# Patient Record
Sex: MALE | Race: WHITE | Employment: FULL TIME | URBAN - METROPOLITAN AREA
[De-identification: names, ages, dates, MRNs, and addresses within clinical notes are randomized per-mention and may not be internally consistent; named-entity substitution may affect disease eponyms.]

---

## 2017-01-05 ENCOUNTER — OFFICE VISIT (OUTPATIENT)
Dept: UROLOGY | Facility: CLINIC | Age: 52
End: 2017-01-05
Payer: COMMERCIAL

## 2017-01-05 VITALS
DIASTOLIC BLOOD PRESSURE: 94 MMHG | SYSTOLIC BLOOD PRESSURE: 126 MMHG | HEIGHT: 71 IN | WEIGHT: 185 LBS | BODY MASS INDEX: 25.9 KG/M2

## 2017-01-05 DIAGNOSIS — N35.919 URETHRAL STRICTURE: Primary | ICD-10-CM

## 2017-01-05 DIAGNOSIS — N35.011 POST-TRAUMATIC BULBOUS URETHRAL STRICTURE: Primary | ICD-10-CM

## 2017-01-05 PROCEDURE — 99213 OFFICE O/P EST LOW 20 MIN: CPT | Mod: 25 | Performed by: UROLOGY

## 2017-01-05 PROCEDURE — 52281 CYSTOSCOPY AND TREATMENT: CPT | Performed by: UROLOGY

## 2017-01-05 RX ORDER — SIMVASTATIN 10 MG
TABLET ORAL
Refills: 1 | COMMUNITY
Start: 2016-11-18 | End: 2017-08-07

## 2017-01-05 RX ORDER — CIPROFLOXACIN 500 MG/1
500 TABLET, FILM COATED ORAL 2 TIMES DAILY
Qty: 1 TABLET | Refills: 0 | Status: SHIPPED | OUTPATIENT
Start: 2017-01-05 | End: 2017-02-06

## 2017-01-05 ASSESSMENT — PAIN SCALES - GENERAL: PAINLEVEL: NO PAIN (0)

## 2017-01-05 NOTE — Clinical Note
"1/5/2017       RE: Montana Jo  [address] [address]     Dear Colleague,    Thank you for referring your patient, Montana Jo, to the [facility] [place] at [facility]. Please see a copy of my visit note below.    History: it is a great pleasure to see this very pleasant 51-year-old gentleman for the first time today.  He rosemary been seen by my partners in the past who have now retired.  He has a long history over 30 years of a stricture in the urethra which is being dilated every 2 years.  The symptoms are returning, and he is ready for another dilatation.  He does also have a strong family history of prostate cancer.      Past Medical History   Diagnosis Date     High cholesterol        Social History     Social History     Marital Status:      Spouse Name: N/A     Number of Children: N/A     Years of Education: N/A     Social History Main Topics     Smoking status: Never Smoker      Smokeless tobacco: Never Used     Alcohol Use: None     Drug Use: None     Sexual Activity: Not Asked     Other Topics Concern     None     Social History Narrative     None       No past surgical history on file.    No family history on file.      Current outpatient prescriptions:      ciprofloxacin (CIPRO) 500 MG tablet, Take 1 tablet (500 mg) by mouth 2 times daily, Disp: 1 tablet, Rfl: 0     simvastatin (ZOCOR) 10 MG tablet, TK 1 T PO HS, Disp: , Rfl: 1    10 point ROS of systems including Constitutional, Eyes, Respiratory, Cardiovascular, Gastroenterology, Genitourinary, Integumentary, Muscularskeletal, Psychiatric were all negative except for pertinent positives noted in my HPI.    Examination:   /94 mmHg  Ht 1.803 m (5' 11\")  Wt 83.915 kg (185 lb)  BMI 25.81 kg/m2  General Impression: very pleasant gentleman in no acute distress, well-oriented in time place and person  Mental Status: normal    Procedure.  Cystoscopy with " urethral dilatation of stricture  Surgeon.   Nasim.  Anesthesia.  Local anesthesia.  Description.  With the patient in supine position and the genital area prepped and draped in the customary fashion, the flexible cystoscope was carefully passed into the penile urethra.  The meatus was normal.  There was a narrow stricture in the mid urethra which was discrete and was a relatively short stenotic segment.  I wasable to pass the instrument through this very carefully and observe that the rest of the proximal urethra was not affected by stricture.  That the external sphincter was intact, the prostatic lobes were not significantly hypoplastic, and the the interior the bladder showed no evidence of neoplasm or stone.  I passed an 035 stiff wirethrough the cystoscope into the bladder and withdrew the cystoscope.  i then dilated the stricture over the guidewire with Ordoñez sounds from 16 up to 24 Liberian.  At the completion of the procedure I withdrew the guidewire.    Impression.  He would likely need another dilatation in 2 years, but I have discussed with him the possibility of urethroplasty being considered to recommend him meet with Dr. Deni Foster for discussion about this option.  I did discuss the entire situation with the patient in detail today.  I answered all his questions        Plan: we will plan repeat dilatation in 2 years but I would like him to see Dr. Foster for his opinion with regarding urethroplasty    Time: 15 minutes was spent in addition to the procedure as this was our first meeting requiring careful review of the records and discussion ultimately about the possibility of urethroplasty          Again, thank you for allowing me to participate in the care of your patient.      Sincerely,    Chintan Rouse MD

## 2017-01-05 NOTE — PROGRESS NOTES
"History: it is a great pleasure to see this very pleasant 51-year-old gentleman for the first time today.  He rosemary been seen by my partners in the past who have now retired.  He has a long history over 30 years of a stricture in the urethra which is being dilated every 2 years.  The symptoms are returning, and he is ready for another dilatation.  He does also have a strong family history of prostate cancer.      Past Medical History   Diagnosis Date     High cholesterol        Social History     Social History     Marital Status:      Spouse Name: N/A     Number of Children: N/A     Years of Education: N/A     Social History Main Topics     Smoking status: Never Smoker      Smokeless tobacco: Never Used     Alcohol Use: None     Drug Use: None     Sexual Activity: Not Asked     Other Topics Concern     None     Social History Narrative     None       No past surgical history on file.    No family history on file.      Current outpatient prescriptions:      ciprofloxacin (CIPRO) 500 MG tablet, Take 1 tablet (500 mg) by mouth 2 times daily, Disp: 1 tablet, Rfl: 0     simvastatin (ZOCOR) 10 MG tablet, TK 1 T PO HS, Disp: , Rfl: 1    10 point ROS of systems including Constitutional, Eyes, Respiratory, Cardiovascular, Gastroenterology, Genitourinary, Integumentary, Muscularskeletal, Psychiatric were all negative except for pertinent positives noted in my HPI.    Examination:   /94 mmHg  Ht 1.803 m (5' 11\")  Wt 83.915 kg (185 lb)  BMI 25.81 kg/m2  General Impression: very pleasant gentleman in no acute distress, well-oriented in time place and person  Mental Status: normal    Procedure.  Cystoscopy with urethral dilatation of stricture  Surgeon.   Nasim.  Anesthesia.  Local anesthesia.  Description.  With the patient in supine position and the genital area prepped and draped in the customary fashion, the flexible cystoscope was carefully passed into the penile urethra.  The meatus was normal.  There was " a narrow stricture in the mid urethra which was discrete and was a relatively short stenotic segment.  I wasable to pass the instrument through this very carefully and observe that the rest of the proximal urethra was not affected by stricture.  That the external sphincter was intact, the prostatic lobes were not significantly hypoplastic, and the the interior the bladder showed no evidence of neoplasm or stone.  I passed an 035 stiff wirethrough the cystoscope into the bladder and withdrew the cystoscope.  i then dilated the stricture over the guidewire with Ordoñez sounds from 16 up to 24 Swiss.  At the completion of the procedure I withdrew the guidewire.    Impression.  He would likely need another dilatation in 2 years, but I have discussed with him the possibility of urethroplasty being considered to recommend him meet with Dr. Deni Foster for discussion about this option.  I did discuss the entire situation with the patient in detail today.  I answered all his questions        Plan: we will plan repeat dilatation in 2 years but I would like him to see Dr. Foster for his opinion with regarding urethroplasty    Time: 15 minutes was spent in addition to the procedure as this was our first meeting requiring careful review of the records and discussion ultimately about the possibility of urethroplasty

## 2017-01-05 NOTE — Clinical Note
Date:January 6, 2017      Patient was self referred, no letter generated. Do not send.        [facility] Health Information

## 2017-01-05 NOTE — NURSING NOTE
Chief Complaint   Patient presents with     Other     Patient is here for dilation      Valdez Irving LPN 3:44 PM January 5, 2017

## 2017-01-09 ENCOUNTER — PRE VISIT (OUTPATIENT)
Dept: UROLOGY | Facility: CLINIC | Age: 52
End: 2017-01-09

## 2017-01-09 DIAGNOSIS — N35.919 URETHRAL STRICTURE: Primary | ICD-10-CM

## 2017-01-09 NOTE — TELEPHONE ENCOUNTER
Records are available in [facility]. Message sent to [facility] regarding [facility]UG orders. nu

## 2017-01-31 ENCOUNTER — PRE VISIT (OUTPATIENT)
Dept: UROLOGY | Facility: CLINIC | Age: 52
End: 2017-01-31

## 2017-01-31 NOTE — TELEPHONE ENCOUNTER
Patient with a urethral stricture coming in for a consult. Chart reviewed and records are available, and imaging is scheduled. Pt had a recent dilation. Message left asking patient to come with a full bladder for a flow/pvr.

## 2017-02-06 ENCOUNTER — OFFICE VISIT (OUTPATIENT)
Dept: UROLOGY | Facility: CLINIC | Age: 52
End: 2017-02-06

## 2017-02-06 VITALS
HEIGHT: 71 IN | SYSTOLIC BLOOD PRESSURE: 147 MMHG | WEIGHT: 193.1 LBS | BODY MASS INDEX: 27.03 KG/M2 | HEART RATE: 98 BPM | DIASTOLIC BLOOD PRESSURE: 89 MMHG

## 2017-02-06 DIAGNOSIS — N35.014 POST-TRAUMATIC MALE URETHRAL STRICTURE: Primary | ICD-10-CM

## 2017-02-06 RX ORDER — SILDENAFIL 100 MG/1
100 TABLET, FILM COATED ORAL
COMMUNITY
Start: 2017-01-20 | End: 2020-10-22

## 2017-02-06 RX ORDER — ATORVASTATIN CALCIUM 10 MG/1
10 TABLET, FILM COATED ORAL
COMMUNITY
Start: 2017-01-20 | End: 2017-04-21

## 2017-02-06 RX ORDER — CEFTRIAXONE 1 G/1
1 INJECTION, POWDER, FOR SOLUTION INTRAMUSCULAR; INTRAVENOUS EVERY 24 HOURS
Status: CANCELLED | OUTPATIENT
Start: 2017-02-06

## 2017-02-06 ASSESSMENT — PAIN SCALES - GENERAL: PAINLEVEL: NO PAIN (0)

## 2017-02-06 NOTE — Clinical Note
"2/6/2017       RE: Montana Jo  [address]  [facility] [address]     Dear Colleague,    Thank you for referring your patient, Montana Jo, to the [facility] UROLOGY AND [facility] FOR PROSTATE AND UROLOGIC CANCERS at [facility]. Please see a copy of my visit note below.    Urology Consult History and Physical    Name: Montana Jo    MRN: [id number]   YOB: 1965         CHIEF COMPLAINT:  Urethral stricture.  HISTORY OF PRESENT ILLNESS:  Mr. Jo is a 51 year old-year-old man seen in consultation from Dr. Rouse for urethral stricture.   He has a 30 year history of urethral stricture being dilated every 2 years following a straddle injury when he was jumping over a pole in his 20's. Initially the dilatations were more often but this has decreased to every 2 years now. His last dilatation done on 1/5/2017 in the office by  was particularly painful and had 24 hours of hematuria after it which led to his interest in other options.   Symptoms include slow stream and sense of incomplete emptying. Stream is subjectively \"pretty good\". It does not spray or take longer. He does mention he needs to strain on occasion and \"squeezes the scrotum\" to empty his bladder.    He does not have a history of self dilation. He has not needed a suprapubic catheter.   Etiology:  . He confirms a history of straddle injury in his 20's as described above. He has no history of sexually transmitted disease. He denies a history of pelvic fracture. His only history of urethral catherization was post-injury in his 20's when he had a catheter in place for approximately 5 weeks. He was able to urinate right away but did not have complete sphincter control.    He does also have a strong family history of prostate cancer including his brother, father, and grandfather. He is not sure of his PSA but mentioned that it was normal.     REVIEW OF QUESTIONNAIRES:  Questionnaires reviewed. " "See flowsheet for details.  Past Medical History   Diagnosis Date     High cholesterol      Urethral straddle injury      Urethral stricture      Past Surgical History   Procedure Laterality Date     Urethral dilation       Current Outpatient Prescriptions   Medication     sildenafil (VIAGRA) 100 MG cap/tab     simvastatin (ZOCOR) 10 MG tablet     atorvastatin (LIPITOR) 10 MG tablet     No current facility-administered medications for this visit.     Allergies as of 02/06/2017     (No Known Allergies)       No family history on file.  Social History     Social History     Marital Status:      Spouse Name: N/A     Number of Children: N/A     Years of Education: N/A     Occupational History     Not on file.     Social History Main Topics     Smoking status: Never Smoker      Smokeless tobacco: Never Used     Alcohol Use: Not on file     Drug Use: Not on file     Sexual Activity: Not on file     Other Topics Concern     Not on file     Social History Narrative     REVIEW OF SYSTEMS:  A 14-point review of systems is negative.  PHYSICAL EXAMINATION:  General:  Well-dressed, well-nourished man in no acute distress.  Vitals: /89 mmHg  Pulse 98  Ht 1.803 m (5' 11\")  Wt 87.59 kg (193 lb 1.6 oz)  BMI 26.94 kg/m2 Estimated body mass index is 26.94 kg/(m^2) as calculated from the following:    Height as of this encounter: 1.803 m (5' 11\").    Weight as of this encounter: 87.59 kg (193 lb 1.6 oz).    Eyes: anicteric, EOMI  Lymph: No cervical, supraclavicular or axillary lymphadenopathy  Lungs:  No respiratory distress.  Heart:  Regular rate and rhythm.     Abdomen:  soft, nontender, without masses.  There are no surgical scars.    :  No lumps, meatal stenosis, penile plaques or skin lesions are present. Mild firmness on right side of scrotum which might correspond with area of previous. Testes are 10 mL bilaterally without masses.  Rectal examination was not done.   Musculoskeletal:  Motor strength is " excellent throughout.     Neurologic:  Grossly nonfocal.    Back: Flanks are nontender.  REVIEW OF IMAGING STUDIES:  Retrograde urethrogram and voiding cystourethrogram were performed earlier and the images were independently interpreted by me and are reviewed with the patient.  These demonstrate a 1-1.5 cm urethral stricture.      REVIEW OF OFFICE STUDIES:     REVIEW OF OUTSIDE RECORDS:  I reviewed outside records for 10 minutes.  Findings include:  His first intervention for urethral stricture was done in his 20's. He has had multiple dilatations - previously more often and now less often every 2 years. His prior two urologists have retired and  is his third urologist.   ASSESSMENT/PLAN:  A 51 year old-year-old gentleman with urethral stricture refractory to minimally invasive management.  He has been managed by prior urologist(s) and is referred to our center for advanced treatment options.  Risks, benefits, and alternatives of repeat urethrotomy versus urethroplasty were described. He understands the risks to include but not be limited to bleeding, infection, penile pain or numbness, scrotal pain or numbness, lower extremity neuropathy, change in erectile function, change in ejaculatory function, and need for additional procedures. He understands the success rate of urethroplasty to be about 95% for anastomotic and 85% for augmentation procedures. We plan EPA for straddle injury.     Since the etiology of his urethral structure is due to trauma, he would be better suited for an anastomotic urethroplasty. Since he had a recent dilatation we will plan to wait approximately 3 months prior to doing the procedure. He wishes to delay until April, which I also think is best so that we can allow recovery after the recent dilation.      I, Branden Richardson, am serving as scribe for services rendered by .   Deni Foster MD, MS4    The medical student acted as a scribe for this note. All portions  of the documented history and physical were personally performed by me as the attending physician.

## 2017-02-06 NOTE — PROGRESS NOTES
"Urology Consult History and Physical    Name: Montana Jo    MRN: [id number]   YOB: 1965         CHIEF COMPLAINT:  Urethral stricture.  HISTORY OF PRESENT ILLNESS:  Mr. Jo is a 51 year old-year-old man seen in consultation from Dr. Rouse for urethral stricture.   He has a 30 year history of urethral stricture being dilated every 2 years following a straddle injury when he was jumping over a pole in his 20's. Initially the dilatations were more often but this has decreased to every 2 years now. His last dilatation done on 1/5/2017 in the office by  was particularly painful and had 24 hours of hematuria after it which led to his interest in other options.   Symptoms include slow stream and sense of incomplete emptying. Stream is subjectively \"pretty good\". It does not spray or take longer. He does mention he needs to strain on occasion and \"squeezes the scrotum\" to empty his bladder.    He does not have a history of self dilation. He has not needed a suprapubic catheter.   Etiology:  . He confirms a history of straddle injury in his 20's as described above. He has no history of sexually transmitted disease. He denies a history of pelvic fracture. His only history of urethral catherization was post-injury in his 20's when he had a catheter in place for approximately 5 weeks. He was able to urinate right away but did not have complete sphincter control.    He does also have a strong family history of prostate cancer including his brother, father, and grandfather. He is not sure of his PSA but mentioned that it was normal.     REVIEW OF QUESTIONNAIRES:  Questionnaires reviewed. See flowsheet for details.  Past Medical History   Diagnosis Date     High cholesterol      Urethral straddle injury      Urethral stricture      Past Surgical History   Procedure Laterality Date     Urethral dilation       Current Outpatient Prescriptions   Medication     sildenafil (VIAGRA) 100 MG cap/tab     " "simvastatin (ZOCOR) 10 MG tablet     atorvastatin (LIPITOR) 10 MG tablet     No current facility-administered medications for this visit.     Allergies as of 02/06/2017     (No Known Allergies)       No family history on file.  Social History     Social History     Marital Status:      Spouse Name: N/A     Number of Children: N/A     Years of Education: N/A     Occupational History     Not on file.     Social History Main Topics     Smoking status: Never Smoker      Smokeless tobacco: Never Used     Alcohol Use: Not on file     Drug Use: Not on file     Sexual Activity: Not on file     Other Topics Concern     Not on file     Social History Narrative     REVIEW OF SYSTEMS:  A 14-point review of systems is negative.  PHYSICAL EXAMINATION:  General:  Well-dressed, well-nourished man in no acute distress.  Vitals: /89 mmHg  Pulse 98  Ht 1.803 m (5' 11\")  Wt 87.59 kg (193 lb 1.6 oz)  BMI 26.94 kg/m2 Estimated body mass index is 26.94 kg/(m^2) as calculated from the following:    Height as of this encounter: 1.803 m (5' 11\").    Weight as of this encounter: 87.59 kg (193 lb 1.6 oz).    Eyes: anicteric, EOMI  Lymph: No cervical, supraclavicular or axillary lymphadenopathy  Lungs:  No respiratory distress.  Heart:  Regular rate and rhythm.     Abdomen:  soft, nontender, without masses.  There are no surgical scars.    :  No lumps, meatal stenosis, penile plaques or skin lesions are present. Mild firmness on right side of scrotum which might correspond with area of previous. Testes are 10 mL bilaterally without masses.  Rectal examination was not done.   Musculoskeletal:  Motor strength is excellent throughout.     Neurologic:  Grossly nonfocal.    Back: Flanks are nontender.  REVIEW OF IMAGING STUDIES:  Retrograde urethrogram and voiding cystourethrogram were performed earlier and the images were independently interpreted by me and are reviewed with the patient.  These demonstrate a 1-1.5 cm urethral " stricture.      REVIEW OF OFFICE STUDIES:     REVIEW OF OUTSIDE RECORDS:  I reviewed outside records for 10 minutes.  Findings include:  His first intervention for urethral stricture was done in his 20's. He has had multiple dilatations - previously more often and now less often every 2 years. His prior two urologists have retired and  is his third urologist.   ASSESSMENT/PLAN:  A 51 year old-year-old gentleman with urethral stricture refractory to minimally invasive management.  He has been managed by prior urologist(s) and is referred to our center for advanced treatment options.  Risks, benefits, and alternatives of repeat urethrotomy versus urethroplasty were described. He understands the risks to include but not be limited to bleeding, infection, penile pain or numbness, scrotal pain or numbness, lower extremity neuropathy, change in erectile function, change in ejaculatory function, and need for additional procedures. He understands the success rate of urethroplasty to be about 95% for anastomotic and 85% for augmentation procedures. We plan EPA for straddle injury.     Since the etiology of his urethral structure is due to trauma, he would be better suited for an anastomotic urethroplasty. Since he had a recent dilatation we will plan to wait approximately 3 months prior to doing the procedure. He wishes to delay until April, which I also think is best so that we can allow recovery after the recent dilation.      I, Branden Richardson, am serving as scribe for services rendered by .   Deni Foster MD, MS4    The medical student acted as a scribe for this note. All portions of the documented history and physical were personally performed by me as the attending physician.

## 2017-02-06 NOTE — NURSING NOTE
"Chief Complaint   Patient presents with     Consult     Urethral stricture consult - discuss treatment options       Blood pressure 147/89, pulse 98, height 1.803 m (5' 11\"), weight 87.59 kg (193 lb 1.6 oz). Body mass index is 26.94 kg/(m^2).    Patient Active Problem List   Diagnosis     Calf pain       No Known Allergies    Current Outpatient Prescriptions   Medication Sig Dispense Refill     sildenafil (VIAGRA) 100 MG cap/tab Take 100 mg by mouth       simvastatin (ZOCOR) 10 MG tablet TK 1 T PO HS  1     atorvastatin (LIPITOR) 10 MG tablet Take 10 mg by mouth         Social History   Substance Use Topics     Smoking status: Never Smoker      Smokeless tobacco: Never Used     Alcohol Use: Not on file       ERNST Seo  2/6/2017  2:28 PM       "

## 2017-02-08 DIAGNOSIS — N35.919 URETHRAL STRICTURE: Primary | ICD-10-CM

## 2017-04-13 ENCOUNTER — CARE COORDINATION (OUTPATIENT)
Dept: UROLOGY | Facility: CLINIC | Age: 52
End: 2017-04-13

## 2017-04-13 DIAGNOSIS — N35.919 URETHRAL STRICTURE: Primary | ICD-10-CM

## 2017-04-13 DIAGNOSIS — N35.919 URETHRAL STRICTURE: ICD-10-CM

## 2017-04-13 NOTE — PROGRESS NOTES
Upcoming surgical procedure with Dr Deni Foster on 4.21.17 at 0715, check in at 0545.   Surgery at ([facility] or [place]): [facility]  Patient is having a Posterior urethroplasty  Pre-op physical completed: YES. Date-3.28.17.  PAC: No  Bowel Prep: No, not needed  Urine culture completed: YES. Date-4.13.17 NO GROWTH.  Post-operative appointment needed: YES. Date-5.8.17 at 1000 with Dr Smiley, [facility] at 0900.  All questions answered.    Patient verbalized understanding. No further questions.      Lavern Valdez, RN-BC, BSN  Care Coordinator - Reconstructive Urology  [phone]

## 2017-04-14 LAB
BACTERIA SPEC CULT: NO GROWTH
Lab: NORMAL
MICRO REPORT STATUS: NORMAL
SPECIMEN SOURCE: NORMAL

## 2017-04-19 ENCOUNTER — ANESTHESIA EVENT (OUTPATIENT)
Dept: SURGERY | Facility: AMBULATORY SURGERY CENTER | Age: 52
End: 2017-04-19

## 2017-04-21 ENCOUNTER — HOSPITAL ENCOUNTER (OUTPATIENT)
Facility: AMBULATORY SURGERY CENTER | Age: 52
End: 2017-04-21
Attending: UROLOGY

## 2017-04-21 ENCOUNTER — ANESTHESIA (OUTPATIENT)
Dept: SURGERY | Facility: AMBULATORY SURGERY CENTER | Age: 52
End: 2017-04-21

## 2017-04-21 VITALS
DIASTOLIC BLOOD PRESSURE: 92 MMHG | HEIGHT: 72 IN | RESPIRATION RATE: 15 BRPM | WEIGHT: 193 LBS | SYSTOLIC BLOOD PRESSURE: 138 MMHG | OXYGEN SATURATION: 98 % | TEMPERATURE: 97.8 F | BODY MASS INDEX: 26.14 KG/M2

## 2017-04-21 DIAGNOSIS — N35.9 URETHRAL STRICTURE, UNSPECIFIED STRICTURE TYPE: Primary | ICD-10-CM

## 2017-04-21 RX ORDER — NITROFURANTOIN 25; 75 MG/1; MG/1
100 CAPSULE ORAL 2 TIMES DAILY
Qty: 30 CAPSULE | Refills: 0 | Status: SHIPPED | OUTPATIENT
Start: 2017-04-21 | End: 2017-08-07

## 2017-04-21 RX ORDER — SODIUM CHLORIDE, SODIUM LACTATE, POTASSIUM CHLORIDE, CALCIUM CHLORIDE 600; 310; 30; 20 MG/100ML; MG/100ML; MG/100ML; MG/100ML
INJECTION, SOLUTION INTRAVENOUS CONTINUOUS
Status: DISCONTINUED | OUTPATIENT
Start: 2017-04-21 | End: 2017-04-21 | Stop reason: HOSPADM

## 2017-04-21 RX ORDER — ONDANSETRON 2 MG/ML
INJECTION INTRAMUSCULAR; INTRAVENOUS PRN
Status: DISCONTINUED | OUTPATIENT
Start: 2017-04-21 | End: 2017-04-21

## 2017-04-21 RX ORDER — ONDANSETRON 2 MG/ML
4 INJECTION INTRAMUSCULAR; INTRAVENOUS EVERY 30 MIN PRN
Status: DISCONTINUED | OUTPATIENT
Start: 2017-04-21 | End: 2017-04-22 | Stop reason: HOSPADM

## 2017-04-21 RX ORDER — OXYCODONE HYDROCHLORIDE 5 MG/1
10 TABLET ORAL ONCE
Status: COMPLETED | OUTPATIENT
Start: 2017-04-21 | End: 2017-04-21

## 2017-04-21 RX ORDER — FENTANYL CITRATE 50 UG/ML
INJECTION, SOLUTION INTRAMUSCULAR; INTRAVENOUS PRN
Status: DISCONTINUED | OUTPATIENT
Start: 2017-04-21 | End: 2017-04-21

## 2017-04-21 RX ORDER — LIDOCAINE 40 MG/G
CREAM TOPICAL
Status: DISCONTINUED | OUTPATIENT
Start: 2017-04-21 | End: 2017-04-21 | Stop reason: HOSPADM

## 2017-04-21 RX ORDER — CIPROFLOXACIN 500 MG/1
500 TABLET, FILM COATED ORAL 2 TIMES DAILY
Qty: 2 TABLET | Refills: 0 | Status: SHIPPED | OUTPATIENT
Start: 2017-05-08 | End: 2017-08-07

## 2017-04-21 RX ORDER — MEPERIDINE HYDROCHLORIDE 25 MG/ML
12.5 INJECTION INTRAMUSCULAR; INTRAVENOUS; SUBCUTANEOUS
Status: DISCONTINUED | OUTPATIENT
Start: 2017-04-21 | End: 2017-04-22 | Stop reason: HOSPADM

## 2017-04-21 RX ORDER — PROPOFOL 10 MG/ML
INJECTION, EMULSION INTRAVENOUS PRN
Status: DISCONTINUED | OUTPATIENT
Start: 2017-04-21 | End: 2017-04-21

## 2017-04-21 RX ORDER — ONDANSETRON 4 MG/1
4 TABLET, ORALLY DISINTEGRATING ORAL EVERY 30 MIN PRN
Status: DISCONTINUED | OUTPATIENT
Start: 2017-04-21 | End: 2017-04-22 | Stop reason: HOSPADM

## 2017-04-21 RX ORDER — ACETAMINOPHEN 325 MG/1
650 TABLET ORAL EVERY 4 HOURS PRN
Qty: 100 TABLET | Refills: 0 | COMMUNITY
Start: 2017-04-21 | End: 2017-08-07

## 2017-04-21 RX ORDER — LIDOCAINE HYDROCHLORIDE 20 MG/ML
INJECTION, SOLUTION INFILTRATION; PERINEURAL PRN
Status: DISCONTINUED | OUTPATIENT
Start: 2017-04-21 | End: 2017-04-21

## 2017-04-21 RX ORDER — OXYCODONE HYDROCHLORIDE 5 MG/1
5 TABLET ORAL EVERY 4 HOURS PRN
Qty: 30 TABLET | Refills: 0 | Status: SHIPPED | OUTPATIENT
Start: 2017-04-21 | End: 2017-08-07

## 2017-04-21 RX ORDER — AMOXICILLIN 250 MG
1-2 CAPSULE ORAL 2 TIMES DAILY
Qty: 20 TABLET | Refills: 0 | Status: SHIPPED | OUTPATIENT
Start: 2017-04-21 | End: 2017-08-07

## 2017-04-21 RX ORDER — DEXAMETHASONE SODIUM PHOSPHATE 4 MG/ML
INJECTION, SOLUTION INTRA-ARTICULAR; INTRALESIONAL; INTRAMUSCULAR; INTRAVENOUS; SOFT TISSUE PRN
Status: DISCONTINUED | OUTPATIENT
Start: 2017-04-21 | End: 2017-04-21

## 2017-04-21 RX ORDER — CEFTRIAXONE 1 G/1
1 INJECTION, POWDER, FOR SOLUTION INTRAMUSCULAR; INTRAVENOUS EVERY 24 HOURS
Status: DISCONTINUED | OUTPATIENT
Start: 2017-04-21 | End: 2017-04-21 | Stop reason: HOSPADM

## 2017-04-21 RX ORDER — ACETAMINOPHEN 325 MG/1
975 TABLET ORAL ONCE
Status: COMPLETED | OUTPATIENT
Start: 2017-04-21 | End: 2017-04-21

## 2017-04-21 RX ORDER — FENTANYL CITRATE 50 UG/ML
25-50 INJECTION, SOLUTION INTRAMUSCULAR; INTRAVENOUS
Status: DISCONTINUED | OUTPATIENT
Start: 2017-04-21 | End: 2017-04-22 | Stop reason: HOSPADM

## 2017-04-21 RX ORDER — KETOROLAC TROMETHAMINE 30 MG/ML
30 INJECTION, SOLUTION INTRAMUSCULAR; INTRAVENOUS EVERY 6 HOURS PRN
Status: DISCONTINUED | OUTPATIENT
Start: 2017-04-21 | End: 2017-04-22 | Stop reason: HOSPADM

## 2017-04-21 RX ORDER — SODIUM CHLORIDE, SODIUM LACTATE, POTASSIUM CHLORIDE, CALCIUM CHLORIDE 600; 310; 30; 20 MG/100ML; MG/100ML; MG/100ML; MG/100ML
INJECTION, SOLUTION INTRAVENOUS CONTINUOUS
Status: DISCONTINUED | OUTPATIENT
Start: 2017-04-21 | End: 2017-04-22 | Stop reason: HOSPADM

## 2017-04-21 RX ORDER — FENTANYL CITRATE 50 UG/ML
25-50 INJECTION, SOLUTION INTRAMUSCULAR; INTRAVENOUS
Status: DISCONTINUED | OUTPATIENT
Start: 2017-04-21 | End: 2017-04-21 | Stop reason: HOSPADM

## 2017-04-21 RX ORDER — NALOXONE HYDROCHLORIDE 0.4 MG/ML
.1-.4 INJECTION, SOLUTION INTRAMUSCULAR; INTRAVENOUS; SUBCUTANEOUS
Status: DISCONTINUED | OUTPATIENT
Start: 2017-04-21 | End: 2017-04-22 | Stop reason: HOSPADM

## 2017-04-21 RX ORDER — SULFAMETHOXAZOLE AND TRIMETHOPRIM 400; 80 MG/1; MG/1
1 TABLET ORAL 2 TIMES DAILY
Qty: 42 TABLET | Refills: 0 | Status: SHIPPED | OUTPATIENT
Start: 2017-04-21 | End: 2017-04-21

## 2017-04-21 RX ORDER — PROPOFOL 10 MG/ML
INJECTION, EMULSION INTRAVENOUS CONTINUOUS PRN
Status: DISCONTINUED | OUTPATIENT
Start: 2017-04-21 | End: 2017-04-21

## 2017-04-21 RX ORDER — GABAPENTIN 300 MG/1
300 CAPSULE ORAL ONCE
Status: COMPLETED | OUTPATIENT
Start: 2017-04-21 | End: 2017-04-21

## 2017-04-21 RX ORDER — TOLTERODINE TARTRATE 2 MG/1
2 TABLET, EXTENDED RELEASE ORAL EVERY 12 HOURS PRN
Qty: 60 TABLET | Refills: 0 | Status: SHIPPED | OUTPATIENT
Start: 2017-04-21 | End: 2017-08-07

## 2017-04-21 RX ADMIN — SODIUM CHLORIDE, SODIUM LACTATE, POTASSIUM CHLORIDE, CALCIUM CHLORIDE: 600; 310; 30; 20 INJECTION, SOLUTION INTRAVENOUS at 09:32

## 2017-04-21 RX ADMIN — CEFTRIAXONE 1 G: 1 INJECTION, POWDER, FOR SOLUTION INTRAMUSCULAR; INTRAVENOUS at 07:44

## 2017-04-21 RX ADMIN — FENTANYL CITRATE 50 MCG: 50 INJECTION, SOLUTION INTRAMUSCULAR; INTRAVENOUS at 07:27

## 2017-04-21 RX ADMIN — PROPOFOL 200 MG: 10 INJECTION, EMULSION INTRAVENOUS at 07:24

## 2017-04-21 RX ADMIN — FENTANYL CITRATE 50 MCG: 50 INJECTION, SOLUTION INTRAMUSCULAR; INTRAVENOUS at 07:19

## 2017-04-21 RX ADMIN — Medication 0.5 MG: at 09:35

## 2017-04-21 RX ADMIN — SODIUM CHLORIDE, SODIUM LACTATE, POTASSIUM CHLORIDE, CALCIUM CHLORIDE: 600; 310; 30; 20 INJECTION, SOLUTION INTRAVENOUS at 06:41

## 2017-04-21 RX ADMIN — SODIUM CHLORIDE, SODIUM LACTATE, POTASSIUM CHLORIDE, CALCIUM CHLORIDE: 600; 310; 30; 20 INJECTION, SOLUTION INTRAVENOUS at 07:15

## 2017-04-21 RX ADMIN — LIDOCAINE HYDROCHLORIDE 80 MG: 20 INJECTION, SOLUTION INFILTRATION; PERINEURAL at 07:24

## 2017-04-21 RX ADMIN — Medication 0.5 MG: at 09:50

## 2017-04-21 RX ADMIN — PROPOFOL 100 MCG/KG/MIN: 10 INJECTION, EMULSION INTRAVENOUS at 07:25

## 2017-04-21 RX ADMIN — ACETAMINOPHEN 975 MG: 325 TABLET ORAL at 06:30

## 2017-04-21 RX ADMIN — OXYCODONE HYDROCHLORIDE 10 MG: 5 TABLET ORAL at 10:46

## 2017-04-21 RX ADMIN — GABAPENTIN 300 MG: 300 CAPSULE ORAL at 06:30

## 2017-04-21 RX ADMIN — ONDANSETRON 4 MG: 2 INJECTION INTRAMUSCULAR; INTRAVENOUS at 10:06

## 2017-04-21 RX ADMIN — DEXAMETHASONE SODIUM PHOSPHATE 4 MG: 4 INJECTION, SOLUTION INTRA-ARTICULAR; INTRALESIONAL; INTRAMUSCULAR; INTRAVENOUS; SOFT TISSUE at 07:25

## 2017-04-21 NOTE — DISCHARGE INSTRUCTIONS
[facility]  Home Care Following Anesthesia  For 24 hours after surgery:  1. Get plenty of rest.  A responsible adult must stay with you for at least 24 hours after you leave the surgery center.  2. Do not drive or use heavy equipment.  If you have weakness or tingling, don't drive or use heavy equipment until this feeling goes away.   3. Do not drink alcohol.   4. Avoid strenuous or risky activities.  Ask for help when climbing stairs.  5. You may feel lightheaded.  IF so, sit for a few minutes before standing.  Have someone help you get up.   6. If you have nausea (feel sick to your stomach): Drink only clear liquids such as apple juice, ginger ale, broth or 7-Up.  Rest may also help.  Be sure to drink enough fluids.  Move to a regular diet as you feel able.   7. You may have a slight fever.  Call the doctor if your fever is over 100 F (37.7 C) (taken under the tongue) or lasts longer than 24 hours.  8. You may have a dry mouth, a sore throat, muscle aches or trouble sleeping. These should go away after 24 hours.  9. Do not make important or legal decisions.     Tips for taking pain medications  To get the best pain relief possible, remember these points:    Take pain medications as directed, before pain becomes severe.    Pain medication can upset your stomach: taking it with food may help.    Constipation is a common side effect of pain medication. Drink plenty of  fluids.    Eat foods high in fiber. Take a stool softener if recommended by your doctor or pharmacist.    Do not drink alcohol, drive or operate machinery while taking pain medications.    Ask about other ways to control pain, such as with heat, ice or relaxation.    Call a doctor for any of the followin. Signs of infection (fever, growing tenderness at the surgery site, a large amount of drainage or bleeding, severe pain, foul-smelling drainage, redness, swelling).  2. It has been over 8 to 10 hours since  surgery and you are still not able to urinate (pass water).  3. Headache for over 24 hours.      Your doctor is:  Dr. Deni Cooley, Prostate and Urology: [phone]  Or dial [phone] and ask for the resident on call for:  Prostate Urology  For emergency care, call the:  [facility]:  [phone] (TTY for hearing impaired: [phone])    Care of Indwelling Story Catheter  Cleanliness is VERY important!  1. Wash well around catheter with soap and water and rinse well.  Do this every morning and before bedtime.  2. Empty leg bag or bed bag into toilet whenever it becomes half full.  3. When disconnecting and reconnecting, wipe both the catheter end and tubing tip with alcohol. (You may use commercially prepared alcohol wipes or regular cotton balls soaked in alcohol.)  4. Rinse leg bag and bed bag inside and out after each use. You can soak leg bag and/or bed bag in two to three ounces of white vinegar when not in use. Rinse thoroughly before reconnecting to catheter.  5. You may clamp the catheter for short periods of time (two to three hours) if you are not uncomfortable. If planning intercourse: clamp catheter, disconnect from bag and   a) Tape catheter along shaft of penis, if male; or  b) Tape catheter to abdomen, if female  6. Drink lots of fluids (at least eight to ten cups/glasses per day) and take two to four grams of Vitamin C (optional) per day.      7. Watch for sign of catheter-associated urinary tract infection which include:      Cloudy urine, sediment in urine (may look like sand particles or white flakes), foul smelling urine    A burning feeling, pressure or pain in your lower abdomen    A burning feeling in the urethra or genitalia    Aching in the back (by the kidney)    At the time of discharge from the hospital, you have a 16 Story catheter with a 10 cc balloon. It was inserted on April 21, 2017 and should be changed one month from that date on 5/21/17.     You have been  prescribed a narcotic pain reliever that does NOT contain Tylenol/acetaminophen.      If you feel your pain relief is insufficient, you may take Tylenol/acetaminophen in addition to your narcotic pain medication.       Be careful not to exceed 3,000 mg of Tylenol/acetaminophen in a 24 hour period.      If you are taking extra strength Tylenol/acetaminophen (500 mg), the maximum dose is 6 tablets in 24 hours.      If you are taking regular strength acetaminophen (325 mg), the maximum dose is 9 tablets in 24 hours.

## 2017-04-21 NOTE — IP AVS SNAPSHOT
MRN:[id number]                      After Visit Summary   4/21/2017    [place] Jo    MRN: [id number]           Thank you!     Thank you for choosing [facility] for your care. Our goal is always to provide you with excellent care. Hearing back from our patients is one way we can continue to improve our services. Please take a few minutes to complete the written survey that you may receive in the mail after you visit with us. Thank you!        Patient Information     Date Of Birth          1965        About your hospital stay     You were admitted on:  April 21, 2017 [facility]    You were discharged on:  April 21, 2017       Who to Call     For medical emergencies, please call 911.  For non-urgent questions about your medical care, please call your primary care provider or clinic, None  For questions related to your surgery, please call your surgery clinic        Attending Provider     Provider Deni Mullen MD Urology       Primary Care Provider    Provider Unknown       No address on file        After Care Instructions     Discharge Instructions       Procedure:  Urethroplasty     Pain:  There will be discomfort in the area of the surgery.  You will go home with Oxycodone which is a narcotic pain medication which should only be required for the first two to three days -- because the narcotics will cause sleepiness and constipation it is best to stop or reduce them at that time if you can.  After this, using Tylenol and or ibuprofen (Advil/Motrin) as directed on the packaging should be sufficient.  The pain should continue to get better on a daily basis; if it doesn't please call us.    You may notice bladder spasms which can present as sharp sudden pains in the area of bladder (just above the pubic hair) or the tip of the penis.  This is usually because the rivera catheter irritates the bladder. You may feel  like you have to urinate even though the bladder is fully drained. You will be given a prescription (see below) of a medication to be taken as needed, which can help with these. It is important to take the bladder spasm medicine because occasionally these bladder spasms can get intense enough to cause you to urinate around (rather than through) the catheter and this rush of urine can damage the urethroplasty work.    Urination:  You will have a rivera catheter in your penis which should be secured to your abdomen at all times. This catheter will not generally restrict your activities, but you should be careful if you are driving such that it does not become a distraction.  You should apply the bacitracin antibiotic ointment (see below) to the tip of the penis twice a day.  You may notice a little blood, small amount of white discharge, or caking at the tip of the penis with the catheter in place.  This is normal but it can irritate the tip of the penis so it is best to wash this off with a warm washcloth.    Diet:  You may return to your normal diet that you were taking before surgery.     Activity:  You can return to your normal level of activity as you are able, based upon your level of pain. Walking and lifting won t hurt the urethroplasty site but use good judgment: if something hurts then don t do it. Most people take about 2 weeks off from work. Depending on whether you have an active job or a desk job you may be able to then go back to work while the catheter is still in. We leave this up to you based on your pain level. If you go back to work, don't expect to be at 100% until after the catheter comes out.    Restrictions:  You should not drive or drink alcoholic beverages while you are taking narcotic medications (see below).  You should not soak in a tub or pool until the catheter is removed.  Daily showering is important.    Wound Care:      -- Perineum -- the area behind the scrotum is called the perineum.  "All sutures here are dissolvable. You have a dressing that can be removed tomorrow. When you remove the dressing the drain will also come out as this is sutured to this dressing. You will not need any specific bandage on this area, but you may find wearing a small pad (gauze, tissue paper or a sanitary napkin / \"maxi pad\") in your underwear can help protect from blood staining your underwear. The bloods spotting should be no more than quarter-sized per 24-hour period. If you notice more drainage, please give us a call.  You can shower and let the water run over the incision.  You should not scrub it with soap.     Medications:  1.  Oxycodone   this is a narcotic pain medication which you should only need for two to three days after surgery.    2.  Ditropan   this is a bladder spasm medicine which you should take up to three times a day for bladder spasms  3.  Bacitracin Ointment   this is an antibiotic ointment which will help with discomfort from the rivera cathteter  4.  Ciprofloxacin  - this is an antibiotic pill to take the morning of rivera catheter removal  5.  Senna-S   this is a stool softener.  The surgery, pain medications, and anticholinergic medications can lead to constipation.  You can stop this or reduce it if you are having loose stools.  Other over the counter solutions such as prune juice, miralax, fiber products, senna, and dulcolax can also be used.  6.  Macrobid -- this is a mild antibiotic to take twice daily while the catheter is in to prevent a bladder infection.    Follow-up:  Please call our triage nurse at [phone] (and choose option 3) the Monday after your surgery to update us on your progress and so we can answer any questions you have.     Your follow-up appointment in Dr. Foster s clinic is on: 8/29/16. This appointment may be with our Physician's Assistant, Lida Hathaway, or with Dr. Foster's fellow, rather than Dr. Foster. They are part of his team and very " experienced in the post-operative care of his patients.      You will have an X-ray and catheter removal done immediately before the clinic visit:     If you have any questions, please call.  1.  Nursing phone helpline at the Urology clinic (8A-5P M-F): [phone]  2.  If after hours, call [phone] and ask to speak with the Urology resident on call.                  Your next 10 appointments already scheduled     May 08, 2017  9:00 AM CDT   XR CYSTOGRAM VOIDING with UCXR2, UC GIGU RAD   [facility] ([facility])    [address] [address] [phone]           Please bring a list of your current medicines to your exam. (Include vitamins, minerals and over-thecounter medicines.) Leave your valuables at home.  Tell your doctor if there is a chance you may be pregnant.  You do not need to do anything special for this exam.            May 08, 2017 10:00 AM CDT   (Arrive by 9:45 AM)   Post-Op with Eric Smiley DO   [facility] Prostate and Urologic Cancers ([facility])    [address] [place] [address] [phone]              Further instructions from your care team       [facility]  Home Care Following Anesthesia  For 24 hours after surgery:  1. Get plenty of rest.  A responsible adult must stay with you for at least 24 hours after you leave the surgery center.  2. Do not drive or use heavy equipment.  If you have weakness or tingling, don't drive or use heavy equipment until this feeling goes away.   3. Do not drink alcohol.   4. Avoid strenuous or risky activities.  Ask for help when climbing stairs.  5. You may feel lightheaded.  IF so, sit for a few minutes before standing.  Have someone help you get up.   6. If you have nausea (feel sick to your stomach): Drink only clear liquids such as apple juice, ginger ale,  broth or 7-Up.  Rest may also help.  Be sure to drink enough fluids.  Move to a regular diet as you feel able.   7. You may have a slight fever.  Call the doctor if your fever is over 100 F (37.7 C) (taken under the tongue) or lasts longer than 24 hours.  8. You may have a dry mouth, a sore throat, muscle aches or trouble sleeping. These should go away after 24 hours.  9. Do not make important or legal decisions.     Tips for taking pain medications  To get the best pain relief possible, remember these points:    Take pain medications as directed, before pain becomes severe.    Pain medication can upset your stomach: taking it with food may help.    Constipation is a common side effect of pain medication. Drink plenty of  fluids.    Eat foods high in fiber. Take a stool softener if recommended by your doctor or pharmacist.    Do not drink alcohol, drive or operate machinery while taking pain medications.    Ask about other ways to control pain, such as with heat, ice or relaxation.    Call a doctor for any of the followin. Signs of infection (fever, growing tenderness at the surgery site, a large amount of drainage or bleeding, severe pain, foul-smelling drainage, redness, swelling).  2. It has been over 8 to 10 hours since surgery and you are still not able to urinate (pass water).  3. Headache for over 24 hours.      Your doctor is:  Dr. Deni Cooley, Prostate and Urology: [phone]  Or dial [phone] and ask for the resident on call for:  Prostate Urology  For emergency care, call the:  [facility]:  [phone] (TTY for hearing impaired: [phone])    Care of Indwelling Story Catheter  Cleanliness is VERY important!  1. Wash well around catheter with soap and water and rinse well.  Do this every morning and before bedtime.  2. Empty leg bag or bed bag into toilet whenever it becomes half full.  3. When disconnecting and reconnecting, wipe both the catheter end and tubing tip with  alcohol. (You may use commercially prepared alcohol wipes or regular cotton balls soaked in alcohol.)  4. Rinse leg bag and bed bag inside and out after each use. You can soak leg bag and/or bed bag in two to three ounces of white vinegar when not in use. Rinse thoroughly before reconnecting to catheter.  5. You may clamp the catheter for short periods of time (two to three hours) if you are not uncomfortable. If planning intercourse: clamp catheter, disconnect from bag and   a) Tape catheter along shaft of penis, if male; or  b) Tape catheter to abdomen, if female  6. Drink lots of fluids (at least eight to ten cups/glasses per day) and take two to four grams of Vitamin C (optional) per day.      7. Watch for sign of catheter-associated urinary tract infection which include:      Cloudy urine, sediment in urine (may look like sand particles or white flakes), foul smelling urine    A burning feeling, pressure or pain in your lower abdomen    A burning feeling in the urethra or genitalia    Aching in the back (by the kidney)    At the time of discharge from the hospital, you have a 16 Story catheter with a 10 cc balloon. It was inserted on April 21, 2017 and should be changed one month from that date on 5/21/17.     You have been prescribed a narcotic pain reliever that does NOT contain Tylenol/acetaminophen.      If you feel your pain relief is insufficient, you may take Tylenol/acetaminophen in addition to your narcotic pain medication.       Be careful not to exceed 3,000 mg of Tylenol/acetaminophen in a 24 hour period.      If you are taking extra strength Tylenol/acetaminophen (500 mg), the maximum dose is 6 tablets in 24 hours.      If you are taking regular strength acetaminophen (325 mg), the maximum dose is 9 tablets in 24 hours.                Pending Results     Date and Time Order Name Status Description    4/21/2017 0851 Surgical pathology exam In process             Admission Information     Date & Time  Provider Department Dept. Phone    2017 Deni Foster MD [facility] [phone]      Your Vitals Were     Blood Pressure Temperature Respirations Height Weight Pulse Oximetry    140/84 97.8  F (36.6  C) (Temporal) 9 1.829 m (6') 87.5 kg (193 lb) 97%    BMI (Body Mass Index)                   26.18 kg/m2           MyChart Information     [employer] is an electronic gateway that provides easy, online access to your medical records. With [employer], you can request a clinic appointment, read your test results, renew a prescription or communicate with your care team.     To sign up for [employer] visit the website at [email or web address]   You will be asked to enter the access code listed below, as well as some personal information. Please follow the directions to create your username and password.     Your access code is: [email or web address]  Expires: 2017  7:30 AM     Your access code will  in 90 days. If you need help or a new code, please contact your [facility] or call [phone] for assistance.        Care EveryWhere ID     This is your Care EveryWhere ID. This could be used by other organizations to access your [facility] medical records  [id number]           Review of your medicines      START taking        Dose / Directions    acetaminophen 325 MG tablet   Commonly known as:  TYLENOL   Used for:  Urethral stricture, unspecified stricture type        Dose:  650 mg   Take 2 tablets (650 mg) by mouth every 4 hours as needed for other (mild pain)   Quantity:  100 tablet   Refills:  0       ciprofloxacin 500 MG tablet   Commonly known as:  CIPRO   Used for:  Urethral stricture, unspecified stricture type        Dose:  500 mg   Start taking on:  2017   Take 1 tablet (500 mg) by mouth 2 times daily On the morning and evening of your follow up   Quantity:  2 tablet   Refills:  0       nitrofurantoin (macrocrystal-monohydrate) 100 MG  capsule   Commonly known as:  MACROBID   Used for:  Urethral stricture, unspecified stricture type        Dose:  100 mg   Take 1 capsule (100 mg) by mouth 2 times daily Through the day before your follow up appointment   Quantity:  30 capsule   Refills:  0       oxyCODONE 5 MG IR tablet   Commonly known as:  ROXICODONE   Used for:  Urethral stricture, unspecified stricture type        Dose:  5 mg   Take 1 tablet (5 mg) by mouth every 4 hours as needed for pain maximum 6 tablet(s) per day   Quantity:  30 tablet   Refills:  0       senna-docusate 8.6-50 MG per tablet   Commonly known as:  SENOKOT-S;PERICOLACE   Used for:  Urethral stricture, unspecified stricture type        Dose:  1-2 tablet   Take 1-2 tablets by mouth 2 times daily To prevent constipation while taking narcotic pain medication. Start with 1 tablet twice daily. If no bowel movement in 24 hours, increase to 2 tablets twice daily.  Discontinue if you have loose stools or when you are no longer taking narcotics.   Quantity:  20 tablet   Refills:  0       tolterodine 2 MG tablet   Commonly known as:  DETROL   Used for:  Urethral stricture, unspecified stricture type        Dose:  2 mg   Take 1 tablet (2 mg) by mouth every 12 hours as needed for incontinence (Spasms)   Quantity:  60 tablet   Refills:  0         CONTINUE these medicines which have NOT CHANGED        Dose / Directions    MELATONIN PO        Take by mouth nightly as needed   Refills:  0       simvastatin 10 MG tablet   Commonly known as:  ZOCOR        TK 1 T PO HS   Refills:  1       VIAGRA 100 MG cap/tab   Generic drug:  sildenafil        Dose:  100 mg   Take 100 mg by mouth   Refills:  0            Where to get your medicines      These medications were sent to [place] [id number] [facility] [address]  [address] [place] [address] [address] [address]    Hours:  TRANSPLANT PHONE NUMBER [phone] Phone:  [phone]     ciprofloxacin 500 MG tablet     nitrofurantoin (macrocrystal-monohydrate) 100 MG capsule    tolterodine 2 MG tablet         Some of these will need a paper prescription and others can be bought over the counter. Ask your nurse if you have questions.     Bring a paper prescription for each of these medications     oxyCODONE 5 MG IR tablet    senna-docusate 8.6-50 MG per tablet       You don't need a prescription for these medications     acetaminophen 325 MG tablet                Protect others around you: Learn how to safely use, store and throw away your medicines at www.disposemymeds.org.             Medication List: This is a list of all your medications and when to take them. Check marks below indicate your daily home schedule. Keep this list as a reference.      Medications           Morning Afternoon Evening Bedtime As Needed    acetaminophen 325 MG tablet   Commonly known as:  TYLENOL   Take 2 tablets (650 mg) by mouth every 4 hours as needed for other (mild pain)   Last time this was given:  975 mg on 4/21/2017  6:30 AM                                ciprofloxacin 500 MG tablet   Commonly known as:  CIPRO   Take 1 tablet (500 mg) by mouth 2 times daily On the morning and evening of your follow up   Start taking on:  5/8/2017                                MELATONIN PO   Take by mouth nightly as needed                                nitrofurantoin (macrocrystal-monohydrate) 100 MG capsule   Commonly known as:  MACROBID   Take 1 capsule (100 mg) by mouth 2 times daily Through the day before your follow up appointment                                oxyCODONE 5 MG IR tablet   Commonly known as:  ROXICODONE   Take 1 tablet (5 mg) by mouth every 4 hours as needed for pain maximum 6 tablet(s) per day   Last time this was given:  10 mg on 4/21/2017 10:46 AM                                senna-docusate 8.6-50 MG per tablet   Commonly known as:  SENOKOT-S;PERICOLACE   Take 1-2 tablets by mouth 2 times daily To prevent constipation while taking  narcotic pain medication. Start with 1 tablet twice daily. If no bowel movement in 24 hours, increase to 2 tablets twice daily.  Discontinue if you have loose stools or when you are no longer taking narcotics.                                simvastatin 10 MG tablet   Commonly known as:  ZOCOR   TK 1 T PO HS                                tolterodine 2 MG tablet   Commonly known as:  DETROL   Take 1 tablet (2 mg) by mouth every 12 hours as needed for incontinence (Spasms)                                VIAGRA 100 MG cap/tab   Take 100 mg by mouth   Generic drug:  sildenafil

## 2017-04-21 NOTE — IP AVS SNAPSHOT
[facility]    [address] [place] [address]    Phone:  [phone]    Fax:  [phone]                                       After Visit Summary   4/21/2017    Montana Jo    MRN: [id number]           After Visit Summary Signature Page     I have received my discharge instructions, and my questions have been answered. I have discussed any challenges I see with this plan with the nurse or doctor.    ..........................................................................................................................................  Patient/Patient Representative Signature      ..........................................................................................................................................  Patient Representative Print Name and Relationship to Patient    ..................................................               ................................................  Date                                            Time    ..........................................................................................................................................  Reviewed by Signature/Title    ...................................................              ..............................................  Date                                                            Time

## 2017-04-21 NOTE — BRIEF OP NOTE
[facility]    Brief Operative Note    Pre-operative diagnosis: Urethral Stricture  Post-operative diagnosis Same  Procedure: Procedure(s):  Posterior Urethroplasty and cystoscopy - Wound Class: I-Clean  Surgeon: Surgeon(s) and Role:     * Deni Foster MD - Primary     * Eric Smiley DO - Resident - Assisting  Anesthesia: General   Estimated blood loss: Less than 10 ml  Drains: None  Specimens:   ID Type Source Tests Collected by Time Destination   A : urethral stricture Tissue Urethra SURGICAL PATHOLOGY EXAM Eric Smiley DO 4/21/2017  8:50 AM      Findings:   See dicatated note for detailas  Complications: None.  Implants: None.

## 2017-04-21 NOTE — OP NOTE
PREOPERATIVE DIAGNOSIS: Bulbar urethral stricture (1.5 cm).     POSTOPERATIVE DIAGNOSIS: distal Bulbar urethral stricture (1.5 cm).     PROCEDURES:   1. Cystoscopy.  2. Complex single stage posterior urethral reconstruction using excision and primary anastomosis.    SURGEON: Deni Foster MD, MS  ASSISTANT: Eric Smiley DO and Denis Armendariz MD    INDICATIONS: Montana Jo is a 51 year old gentleman who has a bulbar urethral stricture refractory to minimally-invasive management. The risks, benefits and alternatives of urethroplasty were discussed. He wished to proceed. He understands the risks to include but not be limited to bleeding, infection, penile pain or numbness, scrotal pain or numbness, disease recurrence, need for additional procedures and lower extremity neuropathy.    DESCRIPTION OF PROCEDURE: After informed consent was obtained and preoperative antibiotics were given, the patient was taken to the operating room and placed supine on the operating table. General anesthesia was induced and he was intubated.   He was placed in the high lithotomy position with all pressure points well padded. The perineum was shaved, prepped and draped in the usual sterile fashion. A 20-Argentine red rubber catheter was inserted through a well-lubricated urethra up to the site of obstruction.  Stretched penile length: 19 cm  Penile circumference: 9.5 cm  Circumcised: YES and 9  Anogenital distance: 9 cm  A vertical midline incision was made in the perineum and carried down through Colle ' s fascia. The bulbospongiosus muscles were split in the midline and reflected off the bulbar urethra minimally.   Depth of urethra from skin: 2.6 cm  The diameter of the mid-bulbar spongiosum was measured with calipers to be 1.6 cm.  The urethra was circumferentially dissected off the underlying corporal bodies and dissected proximally and distally until it was free from the corporal bodies. We carried our dissection proximally dividing the  perineal body and dissecting the proximal corpus spongiosum off of the pelvic floor.     The urethra was transected at the tip of the red rubber catheter. Holding sutures of 4-0 Vicryl were placed in the proximal and distal urethral ends. Additional urethra was resected until the proximal urethra calibrated at 28F and the distal urethra calibrated at 28F. The distal end was spatulated dorsally and the proximal end ventrally. We resected a total of 1.5 cm of urethra.     A 17-Swedish flexiblecystoscope was inserted through the proximal urethral stump through a normal-appearing sphincter without additional stricture. The prostate was small. The bladder was mildly trabeculated. There were no tumors or stones. The cystoscope was removed. Our proximal urethrostomy was 6 cm distal to the voluntary sphincter.     The distal urethra was mobilized off the underlying corporal bodies up to the penoscrotal junction until the two could reach without tension. Bleeding points were controlled with bipolar cautery. The proximal urethra was mobilized back to the voluntary sphincter and off of the perineal body.     A dorsal wall single layer anastomosis was done with interrupted 5-0 PDS sutures.  A 2-layered ventral anastomosis was done with interrupted and running 6-0 PDS sutures over 16-Swedish silicone catheter. The bulbospongiosus muscles were closed with a running 3-0 Vicryl. Colle's fascia was closed with running 3-0 Vicryl. The skin was closed with interrupted 4-0 Vicryl. The patient was awakened from anesthesia, transferred to [facility] and then to the postanesthesia care unit in stable condition. There were no complications.    cc.  Lithotomy time: 170 min  Operative time: 135 min

## 2017-04-21 NOTE — ANESTHESIA PREPROCEDURE EVALUATION
ANESTHESIA PREOP EVALUATION    HPI: Montana Jo is a 51 year old male who presents for Procedure(s):  Posterior Urethroplasty - Wound Class: I-Clean      No personal or family h/o anesthesia problems.    PMHx/PSHx/ROS:  Past Medical History:   Diagnosis Date     High cholesterol      Urethral straddle injury      Urethral stricture        Past Surgical History:   Procedure Laterality Date     ORTHOPEDIC SURGERY      Left knee scope. No implants.     urethral dilation           Soc Hx:   Social History   Substance Use Topics     Smoking status: Never Smoker     Smokeless tobacco: Never Used     Alcohol use 0.0 oz/week     0 Standard drinks or equivalent per week      Comment: 2-3 glasses of wine per night.       Allergies: No Known Allergies    Meds:     Current Outpatient Prescriptions on File Prior to Encounter:  simvastatin (ZOCOR) 10 MG tablet TK 1 T PO HS   sildenafil (VIAGRA) 100 MG cap/tab Take 100 mg by mouth     No current facility-administered medications on file prior to encounter.     NPO Status: see flowsheet     Labs:                     Anesthesia Plan      History & Physical Review  History and physical reviewed and following examination; no interval change.    ASA Status:  1 .    NPO Status:  > 8 hours    Plan for General with Intravenous induction. Maintenance will be Balanced.    PONV prophylaxis:  Ondansetron (or other 5HT-3)       Postoperative Care  Postoperative pain management:  IV analgesics and Multi-modal analgesia.      Consents  Anesthetic plan, risks, benefits and alternatives discussed with:  Patient..            Niall Chandler MD  Staff Anesthesiologist

## 2017-04-21 NOTE — ANESTHESIA POSTPROCEDURE EVALUATION
Patient: Montana Jo    Procedure(s):  Posterior Urethroplasty and cystoscopy - Wound Class: I-Clean    Diagnosis:Urethral Stricture  Diagnosis Additional Information: No value filed.    Anesthesia Type:  General    Note:  Anesthesia Post Evaluation    Patient location during evaluation: PACU  Patient participation: Able to fully participate in evaluation  Level of consciousness: awake and alert  Pain management: adequate  Airway patency: patent  Cardiovascular status: acceptable  Respiratory status: acceptable  Hydration status: acceptable  PONV: none     Anesthetic complications: None          Last vitals:  Vitals:    04/21/17 1110 04/21/17 1125 04/21/17 1155   BP: 132/90 (!) 133/93 (!) 138/92   Resp: 15 16 15   Temp:   36.6  C (97.8  F)   SpO2: 96% 96% 98%         Electronically Signed By: Niall Chandler MD  April 21, 2017  12:17 PM

## 2017-04-24 LAB — COPATH REPORT: NORMAL

## 2017-04-27 ENCOUNTER — PRE VISIT (OUTPATIENT)
Dept: UROLOGY | Facility: CLINIC | Age: 52
End: 2017-04-27

## 2017-04-27 NOTE — TELEPHONE ENCOUNTER
Patient coming in for urethroplasty 4/21/17 follow up. Patient has VCUG prior to visit. Records/orders in epic. No need to contact patient

## 2017-05-08 ENCOUNTER — OFFICE VISIT (OUTPATIENT)
Dept: UROLOGY | Facility: CLINIC | Age: 52
End: 2017-05-08

## 2017-05-08 VITALS
BODY MASS INDEX: 26.14 KG/M2 | SYSTOLIC BLOOD PRESSURE: 154 MMHG | HEART RATE: 85 BPM | DIASTOLIC BLOOD PRESSURE: 103 MMHG | HEIGHT: 72 IN | WEIGHT: 193 LBS

## 2017-05-08 DIAGNOSIS — N35.011 POST-TRAUMATIC BULBOUS URETHRAL STRICTURE: Primary | ICD-10-CM

## 2017-05-08 ASSESSMENT — PAIN SCALES - GENERAL: PAINLEVEL: NO PAIN (0)

## 2017-05-08 NOTE — PATIENT INSTRUCTIONS
Follow up with Dr Foster on 8/7/17 at 1230 pm for a cystoscopy. Please arrive to the visit with a full bladder    It was a pleasure meeting with you today.  Thank you for allowing me and my team the privilege of caring for you today.  YOU are the reason we are here, and I truly hope we provided you with the excellent service you deserve.  Please let us know if there is anything else we can do for you so that we can be sure you are leaving completely satisfied with your care experience.

## 2017-05-08 NOTE — PROGRESS NOTES
Urethroplasty post-operative visit:    Procedure: excision and primary anastomosis urethroplasty  Date: 17    HPI:  Montana Jo is a 51 year old male who is 2.5 weeks status post EPA. He had VCUG today that showed resolution of stricture with no extravasation. He emptied his bladder well during the test.    Pain is acceptable  Appetite is normal  Bowel movements are normal    BP (!) 154/103 (BP Location: Right arm, Cuff Size: Adult Large)  Pulse 85  Ht 1.829 m (6')  Wt 87.5 kg (193 lb)  BMI 26.18 kg/m2  Incision clean, dry, intact  No tenderness or evidence of cellulitis  No hematoma  Sutures are intact    Imagin17  RUG/VCUG:       RUG/VCUG :        A/P: 51 year old male 2.5 weeks status post EPA urethroplasty   Excellent outcome.    We discussed that thought his catheter was removed, he is still in the healing phase of surgery. He understands he should not lift heavy weights > 10 lbs for another 2 weeks. He is to avoid straddle activities (biking, motorcycle, horse riding, tractor work etc) for another 3 months and in general should avoid these activities. He understands should he require catheterization in the future, to make the performing provider aware of his urethral reconstruction. He expressed understanding.  F/U: 2.5 months for urethroscopy    _________________________________________________________________  Eric Smiley,   Fellow/Instructor  Department of Urology

## 2017-05-08 NOTE — LETTER
2017       RE: Montana Jo  [address]  [place] [address]     Dear Colleague,    Thank you for referring your patient, Montana Jo, to the [facility] FOR PROSTATE AND UROLOGIC CANCERS at [facility]. Please see a copy of my visit note below.    Urethroplasty post-operative visit:    Procedure: excision and primary anastomosis urethroplasty  Date: 17    HPI:  Montana Jo is a 51 year old male who is 2.5 weeks status post EPA. He had VCUG today that showed resolution of stricture with no extravasation. He emptied his bladder well during the test.    Pain is acceptable  Appetite is normal  Bowel movements are normal    BP (!) 154/103 (BP Location: Right arm, Cuff Size: Adult Large)  Pulse 85  Ht 1.829 m (6')  Wt 87.5 kg (193 lb)  BMI 26.18 kg/m2  Incision clean, dry, intact  No tenderness or evidence of cellulitis  No hematoma  Sutures are intact    Imagin17  RUG/VCUG:       RUG/VCUG 17:        A/P: 51 year old male 2.5 weeks status post EPA urethroplasty   Excellent outcome.    We discussed that thought his catheter was removed, he is still in the healing phase of surgery. He understands he should not lift heavy weights > 10 lbs for another 2 weeks. He is to avoid straddle activities (biking, motorcycle, horse riding, tractor work etc) for another 3 months and in general should avoid these activities. He understands should he require catheterization in the future, to make the performing provider aware of his urethral reconstruction. He expressed understanding.  F/U: 2.5 months for urethroscopy    _________________________________________________________________  Eric Smiley, DO  Fellow/Instructor  Department of Urology

## 2017-05-08 NOTE — MR AVS SNAPSHOT
After Visit Summary   5/8/2017    Montana Jo    MRN: [id number]           Patient Information     Date Of Birth          1965        Visit Information        Provider Department      5/8/2017 10:00 AM Eric Smiley DO [facility]        Today's Diagnoses     Post-traumatic bulbous urethral stricture    -  1      Care Instructions    Follow up with Dr Foster on 8/7/17 at 1230 pm for a cystoscopy. Please arrive to the visit with a full bladder    It was a pleasure meeting with you today.  Thank you for allowing me and my team the privilege of caring for you today.  YOU are the reason we are here, and I truly hope we provided you with the excellent service you deserve.  Please let us know if there is anything else we can do for you so that we can be sure you are leaving completely satisfied with your care experience.                Follow-ups after your visit        Follow-up notes from your care team     Return in about 3 months (around 8/8/2017).      Your next 10 appointments already scheduled     May 08, 2017 10:00 AM CDT   (Arrive by 9:45 AM)   Post-Op with Eric Smiley DO   [facility] ([facility])    [address] [place] [id number] [phone]            Aug 07, 2017 12:30 PM CDT   (Arrive by 12:15 PM)   Cystoscopy with Deni Foster MD   [facility] ([facility])    [address] [place] [id number] [phone]              Who to contact     Please call your clinic at [phone] to:    Ask questions about your health    Make or cancel appointments    Discuss your medicines    Learn about your test results    Speak to your doctor   If you have compliments or concerns about an experience at your clinic, or if you wish  to file a complaint, please contact [facility] Patient Relations at [phone] or email us at [email or web address]         Additional Information About Your Visit        [employer] Information     Tetherball is an electronic gateway that provides easy, online access to your medical records. With Tetherball, you can request a clinic appointment, read your test results, renew a prescription or communicate with your care team.     To sign up for Tetherball visit the website at [email or web address]   You will be asked to enter the access code listed below, as well as some personal information. Please follow the directions to create your username and password.     Your access code is: [email or web address]  Expires: 2017  6:30 AM     Your access code will  in 90 days. If you need help or a new code, please contact your [facility] or call [phone] for assistance.        Care EveryWhere ID     This is your Care EveryWhere ID. This could be used by other organizations to access your [place] medical records  [id number]        Your Vitals Were     Pulse Height BMI (Body Mass Index)             85 1.829 m (6') 26.18 kg/m2          Blood Pressure from Last 3 Encounters:   17 (!) 154/103   17 (!) 138/92   17 147/89    Weight from Last 3 Encounters:   17 87.5 kg (193 lb)   17 87.5 kg (193 lb)   17 87.6 kg (193 lb 1.6 oz)              Today, you had the following     No orders found for display       Primary Care Provider    Provider Unknown       No address on file        Thank you!     Thank you for choosing [facility] AND [facility] FOR PROSTATE AND UROLOGIC CANCERS  for your care. Our goal is always to provide you with excellent care. Hearing back from our patients is one way we can continue to improve our services. Please take a few minutes to complete the written survey that you may receive in the mail after your  visit with us. Thank you!             Your Updated Medication List - Protect others around you: Learn how to safely use, store and throw away your medicines at www.disposemymeds.org.          This list is accurate as of: 5/8/17  9:57 AM.  Always use your most recent med list.                   Brand Name Dispense Instructions for use    acetaminophen 325 MG tablet    TYLENOL    100 tablet    Take 2 tablets (650 mg) by mouth every 4 hours as needed for other (mild pain)       ciprofloxacin 500 MG tablet    CIPRO    2 tablet    Take 1 tablet (500 mg) by mouth 2 times daily On the morning and evening of your follow up       MELATONIN PO      Take by mouth nightly as needed       nitrofurantoin (macrocrystal-monohydrate) 100 MG capsule    MACROBID    30 capsule    Take 1 capsule (100 mg) by mouth 2 times daily Through the day before your follow up appointment       oxyCODONE 5 MG IR tablet    ROXICODONE    30 tablet    Take 1 tablet (5 mg) by mouth every 4 hours as needed for pain maximum 6 tablet(s) per day       senna-docusate 8.6-50 MG per tablet    SENOKOT-S;PERICOLACE    20 tablet    Take 1-2 tablets by mouth 2 times daily To prevent constipation while taking narcotic pain medication. Start with 1 tablet twice daily. If no bowel movement in 24 hours, increase to 2 tablets twice daily.  Discontinue if you have loose stools or when you are no longer taking narcotics.       simvastatin 10 MG tablet    ZOCOR     TK 1 T PO HS       tolterodine 2 MG tablet    DETROL    60 tablet    Take 1 tablet (2 mg) by mouth every 12 hours as needed for incontinence (Spasms)       VIAGRA 100 MG cap/tab   Generic drug:  sildenafil      Take 100 mg by mouth

## 2017-08-03 ENCOUNTER — PRE VISIT (OUTPATIENT)
Dept: UROLOGY | Facility: CLINIC | Age: 52
End: 2017-08-03

## 2017-08-03 NOTE — TELEPHONE ENCOUNTER
Patient coming in for cystoscopy d/t h/o urethroplasty. Unable to reach patient via phone. Unable to leave message for patient to contact clinic back regarding coming in with comfortably full bladder.

## 2017-08-07 ENCOUNTER — OFFICE VISIT (OUTPATIENT)
Dept: UROLOGY | Facility: CLINIC | Age: 52
End: 2017-08-07

## 2017-08-07 VITALS
HEART RATE: 65 BPM | SYSTOLIC BLOOD PRESSURE: 133 MMHG | BODY MASS INDEX: 26.14 KG/M2 | WEIGHT: 193 LBS | DIASTOLIC BLOOD PRESSURE: 94 MMHG | HEIGHT: 72 IN

## 2017-08-07 DIAGNOSIS — Z87.448 HISTORY OF URETHRAL STRICTURE: Primary | ICD-10-CM

## 2017-08-07 RX ORDER — ATORVASTATIN CALCIUM 10 MG/1
TABLET, FILM COATED ORAL
Refills: 3 | COMMUNITY
Start: 2017-05-24

## 2017-08-07 ASSESSMENT — PAIN SCALES - GENERAL
PAINLEVEL: NO PAIN (0)
PAINLEVEL: NO PAIN (0)

## 2017-08-07 NOTE — NURSING NOTE
Chief Complaint   Patient presents with     Cystoscopy     history of urethroplasty       Blood pressure (!) 133/94, pulse 65, height 1.829 m (6'), weight 87.5 kg (193 lb). Body mass index is 26.18 kg/(m^2).    Patient Active Problem List   Diagnosis     Calf pain     Post-traumatic male urethral stricture       No Known Allergies    Current Outpatient Prescriptions   Medication Sig Dispense Refill     IBUPROFEN PO Take 200 mg by mouth       atorvastatin (LIPITOR) 10 MG tablet TK 1 T PO QD  3     sildenafil (VIAGRA) 100 MG cap/tab Take 100 mg by mouth         Social History   Substance Use Topics     Smoking status: Never Smoker     Smokeless tobacco: Never Used     Alcohol use 0.0 oz/week     0 Standard drinks or equivalent per week      Comment: 2-3 glasses of wine per night.       ERNST Seo  8/7/2017  1:04 PM

## 2017-08-07 NOTE — NURSING NOTE
Invasive Procedure Safety Checklist:    Procedure:     Action: Complete sections and checkboxes as appropriate.    Pre-procedure:  1. Patient ID Verified with 2 identifiers (Shelly and  or MRN) : YES    2. Procedure and site verified with patient/designee (when able) : YES    3. Accurate consent documentation in medical record : YES    4. H&P (or appropriate assessment) documented in medical record : YES  H&P must be up to 30 days prior to procedure an updated within 24 hours of                 Procedure as applicable.     5. Relevant diagnostic and radiology test results appropriately labeled and displayed as applicable : YES    6. Blood products, implants, devices, and/or special equipment available for the procedure as applicable : YES    7. Procedure site(s) marked with provider initials [Exclusions: None] : NO    8. Marking not required. Reason : Yes  Procedure does not require site marking    Time Out:     Time-Out performed immediately prior to starting procedure, including verbal and active participation of all team members addressing: YES    1. Correct patient identity.  2. Confirmed that the correct side and site are marked.  3. An accurate procedure to be done.  4. Agreement on the procedure to be done.  5. Correct patient position.  6. Relevant images and results are properly labeled and appropriately displayed.  7. The need to administer antibiotics or fluids for irrigation purposes during the procedure as applicable.  8. Safety precautions based on patient history or medication use.    During Procedure: Verification of correct person, site, and procedure occurs any time the responsibility for care of the patient is transferred to another member of the care team.

## 2017-08-07 NOTE — MR AVS SNAPSHOT
After Visit Summary   2017    Montana Jo    MRN: [id number]           Patient Information     Date Of Birth          1965        Visit Information        Provider Department      2017 12:30 PM Deni Foster MD [facility]rology and [facility]rolog[facility]         Follow-ups after your visit        Your next 10 appointments already scheduled     2018 12:30 PM CDT   (Arrive by 12:15 PM)   Cystoscopy with Deni Foster MD   [facility]rology and [facility]rolog[facility] ([facility])    [address] [place] [id number] [phone]              Who to contact     Please call your clinic at [phone] to:    Ask questions about your health    Make or cancel appointments    Discuss your medicines    Learn about your test results    Speak to your doctor   If you have compliments or concerns about an experience at your clinic, or if you wish to file a complaint, please contact [facility] Patient Relations at [phone] or email us at [email or web address]         Additional Information About Your Visit        MyChart Information     Insider Pagest is an electronic gateway that provides easy, online access to your medical records. With [employer]ge, you can request a clinic appointment, read your test results, renew a prescription or communicate with your care team.     To sign up for Insider Pagest visit the website at [email or web address]   You will be asked to enter the access code listed below, as well as some personal information. Please follow the directions to create your username and password.     Your access code is: [email or web address]  Expires: 10/22/2017  6:30 AM     Your access code will  in 90 days. If you need help or a new code, please contact your [facility] or call [phone] for assistance.        Care  EveryWhere ID     This is your Care EveryWhere ID. This could be used by other organizations to access your [place] medical records  [id number]        Your Vitals Were     Pulse Height BMI (Body Mass Index)             65 1.829 m (6') 26.18 kg/m2          Blood Pressure from Last 3 Encounters:   08/07/17 (!) 133/94   05/08/17 (!) 154/103   04/21/17 (!) 138/92    Weight from Last 3 Encounters:   08/07/17 87.5 kg (193 lb)   05/08/17 87.5 kg (193 lb)   04/21/17 87.5 kg (193 lb)              Today, you had the following     No orders found for display         Today's Medication Changes          These changes are accurate as of: 8/7/17  1:55 PM.  If you have any questions, ask your nurse or doctor.               Stop taking these medicines if you haven't already. Please contact your care team if you have questions.     acetaminophen 325 MG tablet   Commonly known as:  TYLENOL   Stopped by:  Deni Foster MD           ciprofloxacin 500 MG tablet   Commonly known as:  CIPRO   Stopped by:  Deni Foster MD           MELATONIN PO   Stopped by:  Deni Foster MD           nitroFURantoin (macrocrystal-monohydrate) 100 MG capsule   Commonly known as:  MACROBID   Stopped by:  Deni Foster MD           oxyCODONE 5 MG IR tablet   Commonly known as:  ROXICODONE   Stopped by:  Deni Foster MD           senna-docusate 8.6-50 MG per tablet   Commonly known as:  SENOKOT-S;PERICOLACE   Stopped by:  Deni Foster MD           simvastatin 10 MG tablet   Commonly known as:  ZOCOR   Stopped by:  Deni Foster MD           tolterodine 2 MG tablet   Commonly known as:  DETROL   Stopped by:  Deni Foster MD                    Primary Care Provider    Provider Unknown       No address on file        Equal Access to Services     RAYSA BULLOCK AH: Hadii neena agustin hadasho Soomaali, waaxda luqadaha, qaybta kaalmada adeegyada, waxay yuki haylauri lewis . [facility]  [phone].    ATENCIÓN: Si holly adler, tiene a ca disposición servicios gratuitos de asistencia lingüística. Evangelina al [phone].    We comply with applicable federal civil rights laws and [place] laws. We do not discriminate on the basis of race, color, national origin, age, disability sex, sexual orientation or gender identity.            Thank you!     Thank you for choosing [facility] UROLOGY AND [facility] FOR PROSTATE AND UROLOGIC CANCERS  for your care. Our goal is always to provide you with excellent care. Hearing back from our patients is one way we can continue to improve our services. Please take a few minutes to complete the written survey that you may receive in the mail after your visit with us. Thank you!             Your Updated Medication List - Protect others around you: Learn how to safely use, store and throw away your medicines at www.disposemymeds.org.          This list is accurate as of: 8/7/17  1:55 PM.  Always use your most recent med list.                   Brand Name Dispense Instructions for use Diagnosis    atorvastatin 10 MG tablet    LIPITOR     TK 1 T PO QD        IBUPROFEN PO      Take 200 mg by mouth        VIAGRA 100 MG cap/tab   Generic drug:  sildenafil      Take 100 mg by mouth

## 2017-08-07 NOTE — LETTER
8/7/2017       RE: Montana Jo  [address] [address]     Dear Colleague,    Thank you for referring your patient, Montana Jo, to the [facility] UROLOGY AND [facility] FOR PROSTATE AND UROLOGIC CANCERS at [facility]. Please see a copy of my visit note below.    Urethroplasty Follow-up Visit with Surveillance Urethroscopy    PRE-PROCEDURE DIAGNOSIS: History of urethral stricture  POST-PROCEDURE DIAGNOSIS: No evidence of urethral stricture   PROCEDURE: Urethroscopy    HISTORY: Montana Jo is a 51 year old man 4 months status-post anastomotic urethroplasty for urethral stricture.     BMG complaints: No  Positioning complaints: No  Perineal / Genital complaints: No  Urinary incontinence: No   He was on Viagra before surgery but now gets no response with 100mg Viagra. He is severely bothered by this but is understanding.       Questionnaires reviewed. See flowsheet for details.    REVIEW OF OFFICE STUDIES:        DESCRIPTION OF PROCEDURE:  After informed consent was obtained, the patient was brought to the procedure room where he was placed in the supine position with all pressure points well padded.  He was prepped and draped in a sterile fashion. A flexible cystoscope was introduced through a well-lubricated urethra.  The anterior urethra up to the point of reconstruction was normal in appearance. At the site of reconstruction there was not evidence of stricture recurrence. The narrowest caliber of the urethra at the reconstruction was estimated to be 28F and this was located in mid-bulb. The flexible cystoscope passed easily. The voluntary sphincter was identified and the scope withdrawn.    ASSESSMENT AND PLAN:  Excellent outcome after urethroplasty. The patient will follow-up in 9 months with with uroflowmetry, post-void residual urine volume measurement, Urethroplasty PROM, DA, MSHQ, and CLSS and post-op questionnaires. Cystoscopy will be needed. If symptoms  recur cystoscopy will be done sooner. Offered him injection therapy for ED but he is not ready for that mentally. He will continue with the Viagra and reassess at 12 months.     Again, thank you for allowing me to participate in the care of your patient.    Deni Foster MD

## 2017-08-07 NOTE — PROGRESS NOTES
Urethroplasty Follow-up Visit with Surveillance Urethroscopy    PRE-PROCEDURE DIAGNOSIS: History of urethral stricture  POST-PROCEDURE DIAGNOSIS: No evidence of urethral stricture   PROCEDURE: Urethroscopy    HISTORY: Montana Jo is a 51 year old man 4 months status-post anastomotic urethroplasty for urethral stricture.     BMG complaints: No  Positioning complaints: No  Perineal / Genital complaints: No  Urinary incontinence: No   He was on Viagra before surgery but now gets no response with 100mg Viagra. He is severely bothered by this but is understanding.       Questionnaires reviewed. See flowsheet for details.    REVIEW OF OFFICE STUDIES:        DESCRIPTION OF PROCEDURE:  After informed consent was obtained, the patient was brought to the procedure room where he was placed in the supine position with all pressure points well padded.  He was prepped and draped in a sterile fashion. A flexible cystoscope was introduced through a well-lubricated urethra.  The anterior urethra up to the point of reconstruction was normal in appearance. At the site of reconstruction there was not evidence of stricture recurrence. The narrowest caliber of the urethra at the reconstruction was estimated to be 28F and this was located in mid-bulb. The flexible cystoscope passed easily. The voluntary sphincter was identified and the scope withdrawn.    ASSESSMENT AND PLAN:  Excellent outcome after urethroplasty. The patient will follow-up in 9 months with with uroflowmetry, post-void residual urine volume measurement, Urethroplasty PROM, DA, MSHQ, and CLSS and post-op questionnaires. Cystoscopy will be needed. If symptoms recur cystoscopy will be done sooner. Offered him injection therapy for ED but he is not ready for that mentally. He will continue with the Viagra and reassess at 12 months.

## 2018-04-16 ENCOUNTER — PRE VISIT (OUTPATIENT)
Dept: UROLOGY | Facility: CLINIC | Age: 53
End: 2018-04-16

## 2018-04-16 NOTE — TELEPHONE ENCOUNTER
Reason for visit: Cystoscopy     Relevant information: 1 year post op     Records/imaging/labs: All records available    Pt called: yes, reminded to come with a full bladder    Rooming: flow/pvr

## 2018-04-23 ENCOUNTER — OFFICE VISIT (OUTPATIENT)
Dept: UROLOGY | Facility: CLINIC | Age: 53
End: 2018-04-23
Payer: COMMERCIAL

## 2018-04-23 VITALS
HEART RATE: 74 BPM | HEIGHT: 72 IN | BODY MASS INDEX: 25.71 KG/M2 | DIASTOLIC BLOOD PRESSURE: 94 MMHG | SYSTOLIC BLOOD PRESSURE: 148 MMHG | WEIGHT: 189.8 LBS

## 2018-04-23 DIAGNOSIS — Z87.448 HISTORY OF URETHRAL STRICTURE: Primary | ICD-10-CM

## 2018-04-23 RX ORDER — ATOVAQUONE AND PROGUANIL HYDROCHLORIDE PEDIATRIC 62.5; 25 MG/1; MG/1
1 TABLET, FILM COATED ORAL
COMMUNITY
Start: 2017-12-19 | End: 2020-10-22

## 2018-04-23 ASSESSMENT — PAIN SCALES - GENERAL
PAINLEVEL: NO PAIN (0)
PAINLEVEL: NO PAIN (0)

## 2018-04-23 NOTE — NURSING NOTE
Chief Complaint   Patient presents with     Cystoscopy     One year post op       Blood pressure (!) 148/94, pulse 74, height 1.829 m (6'), weight 86.1 kg (189 lb 12.8 oz). Body mass index is 25.74 kg/(m^2).    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       ERNST Seo  2018  1:01 PM     Invasive Procedure Safety Checklist:    Procedure:     Action: Complete sections and checkboxes as appropriate.    Pre-procedure:  1. Patient ID Verified with 2 identifiers (Shelly and  or MRN) : YES    2. Procedure and site verified with patient/designee (when able) : YES    3. Accurate consent documentation in medical record : YES    4. H&P (or appropriate assessment) documented in medical record : YES  H&P must be up to 30 days prior to procedure an updated within 24 hours of                 Procedure as applicable.     5. Relevant diagnostic and radiology test results appropriately labeled and displayed as applicable : YES    6. Blood products, implants, devices, and/or special equipment available for the procedure as applicable : YES    7. Procedure site(s) marked with provider initials [Exclusions: None] : NO    8. Marking not required. Reason : Yes  Procedure does not require site marking    Time Out:     Time-Out performed immediately prior to starting procedure, including verbal and active participation of all  team members addressing: YES    1. Correct patient identity.  2. Confirmed that the correct side and site are marked.  3. An accurate procedure to be done.  4. Agreement on the procedure to be done.  5. Correct patient position.  6. Relevant images and results are properly labeled and appropriately displayed.  7. The need to administer antibiotics or fluids for irrigation purposes during the procedure as applicable.  8. Safety precautions based on patient history or medication use.    During Procedure: Verification of correct person, site, and procedure occurs any time the responsibility for care of the patient is transferred to another member of the care team.

## 2018-04-23 NOTE — MR AVS SNAPSHOT
"              After Visit Summary   4/23/2018    Montana Jo    MRN: [id number]           Patient Information     Date Of Birth          1965        Visit Information        Provider Department      4/23/2018 12:30 PM Deni Foster MD [facility] and [facility] Prostate and Urologic C[facility]        Today's Diagnoses     History of urethral stricture    -  1      Care Instructions      AFTER YOUR CYSTOSCOPY        You have just completed a cystoscopy, or \"cysto\", which allowed your physician to learn more about your bladder (or to remove a stent placed after surgery). We suggest that you continue to avoid caffeine, fruit juice, and alcohol for the next 24 hours, however, you are encouraged to return to your normal activities.         A few things that are considered normal after your cystoscopy:     * Small amount of bleeding (or spotting) that clears within the next 24 hours     * Slight burning sensation with urination     * Sensation to of needing to avoid more frequently     * The feeling of \"air\" in your urine     * Mild discomfort that is relieved with Tylenol        Please contact our office promptly if you:     * Develop a fever above 101 degrees     * Are unable to urinate     * Develop bright red blood that does not stop     * Severe pain or swelling         Please contact our office with any concerns or questions @[facility].          Follow-ups after your visit        Follow-up notes from your care team     Return in 12 months (on 4/23/2019).      Who to contact     Please call your clinic at [phone] to:    Ask questions about your health    Make or cancel appointments    Discuss your medicines    Learn about your test results    Speak to your doctor            Additional Information About Your Visit        Accumetrics Information     Accumetrics is an electronic gateway that provides easy, online access to your medical records. With Accumetrics, you can request a clinic appointment, read your " test results, renew a prescription or communicate with your care team.     To sign up for [employer]hophart visit the website at [email or web address]   You will be asked to enter the access code listed below, as well as some personal information. Please follow the directions to create your username and password.     Your access code is: [email or web address]  Expires: 2018  6:31 AM     Your access code will  in 90 days. If you need help or a new code, please contact your [facility] or call [phone] for assistance.        Care EveryWhere ID     This is your Care EveryWhere ID. This could be used by other organizations to access your [place] medical records  [id number]        Your Vitals Were     Pulse Height BMI (Body Mass Index)             74 1.829 m (6') 25.74 kg/m2          Blood Pressure from Last 3 Encounters:   18 (!) 148/94   17 (!) 133/94   17 (!) 154/103    Weight from Last 3 Encounters:   18 86.1 kg (189 lb 12.8 oz)   17 87.5 kg (193 lb)   17 87.5 kg (193 lb)              We Performed the Following     CYSTOURETHROSCOPY        Primary Care Provider    None Specified       No primary provider on file.        Equal Access to Services     S[facility]INapa State HospitalNICK : Hadii neena ku hadasho Soreneeali, waaxda luqadaha, qaybta kaalmada adeegyada, harsh mirza haylauri lewis . So [facility] [phone].    ATENCIÓN: Si habla español, tiene a ca disposición servicios gratuitos de asistencia lingüística. Llame al [phone].    We comply with applicable federal civil rights laws and [place] laws. We do not discriminate on the basis of race, color, national origin, age, disability, sex, sexual orientation, or gender identity.            Thank you!     Thank you for choosing [facility] UROLOGY AND [facility] FOR PROSTATE AND UROLOGIC CANCERS  for your care. Our goal is always to provide you with excellent care. Hearing back from our patients is one way we can  continue to improve our services. Please take a few minutes to complete the written survey that you may receive in the mail after your visit with us. Thank you!             Your Updated Medication List - Protect others around you: Learn how to safely use, store and throw away your medicines at www.disposemymeds.org.          This list is accurate as of 4/23/18  1:40 PM.  Always use your most recent med list.                   Brand Name Dispense Instructions for use Diagnosis    atorvastatin 10 MG tablet    LIPITOR     TK 1 T PO QD        atovaquone-proguanil HCl 62.5-25 MG Tabs      Take 1 tablet by mouth        IBUPROFEN PO      Take 200 mg by mouth        VIAGRA 100 MG tablet   Generic drug:  sildenafil      Take 100 mg by mouth

## 2018-04-23 NOTE — LETTER
4/23/2018       RE: Montana Jo  [address] [facility] [address]     Dear Colleague,    Thank you for referring your patient, Montana Jo, to the [facility] UROLOGY AND [facility] FOR PROSTATE AND UROLOGIC CANCERS at [facility]. Please see a copy of my visit note below.    Urethroplasty Follow-up Visit with Surveillance Urethroscopy    PRE-PROCEDURE DIAGNOSIS: History of urethral stricture  POST-PROCEDURE DIAGNOSIS: No evidence of urethral stricture   PROCEDURE: Urethroscopy    HISTORY: Montana Jo is a 52 year old man 1 year status-post anastomotic urethroplasty for urethral stricture.     BMG complaints: No  Positioning complaints: No  Perineal / Genital complaints: No  Urinary incontinence: No      Questionnaires reviewed. See flowsheet for details.    REVIEW OF OFFICE STUDIES:  Urinary Flow Rate  Peak Flow: 21.6 mL/s  Average Flow: 14.2 mL/s  Voided (mL): 472 mL  Residual Volume by Ultrasound: 37 mL     DESCRIPTION OF PROCEDURE:  After informed consent was obtained, the patient was brought to the procedure room where he was placed in the supine position with all pressure points well padded.  He was prepped and draped in a sterile fashion. A flexible cystoscope was introduced through a well-lubricated urethra.  The anterior urethra up to the point of reconstruction was normal in appearance. At the site of reconstruction there was not evidence of stricture recurrence. The narrowest caliber of the urethra at the reconstruction was estimated to be 28F and this was located in mid bulbar urethra. The flexible cystoscope passed easily. The voluntary sphincter was identified and the scope withdrawn.    ASSESSMENT AND PLAN:  Excellent outcome after urethroplasty. The patient will follow-up prn. He did consent to remote f/u.  ED refractory to Viagra 200. He will call for an Edex visit if he decides he want to try that.     Again, thank you for allowing me to participate in the  care of your patient.      Sincerely,    Deni Foster MD

## 2018-04-23 NOTE — PROGRESS NOTES
Urethroplasty Follow-up Visit with Surveillance Urethroscopy    PRE-PROCEDURE DIAGNOSIS: History of urethral stricture  POST-PROCEDURE DIAGNOSIS: No evidence of urethral stricture   PROCEDURE: Urethroscopy    HISTORY: Montana Jo is a 52 year old man 1 year status-post anastomotic urethroplasty for urethral stricture.     BMG complaints: No  Positioning complaints: No  Perineal / Genital complaints: No  Urinary incontinence: No      Questionnaires reviewed. See flowsheet for details.    REVIEW OF OFFICE STUDIES:  Urinary Flow Rate  Peak Flow: 21.6 mL/s  Average Flow: 14.2 mL/s  Voided (mL): 472 mL  Residual Volume by Ultrasound: 37 mL     DESCRIPTION OF PROCEDURE:  After informed consent was obtained, the patient was brought to the procedure room where he was placed in the supine position with all pressure points well padded.  He was prepped and draped in a sterile fashion. A flexible cystoscope was introduced through a well-lubricated urethra.  The anterior urethra up to the point of reconstruction was normal in appearance. At the site of reconstruction there was not evidence of stricture recurrence. The narrowest caliber of the urethra at the reconstruction was estimated to be 28F and this was located in mid bulbar urethra. The flexible cystoscope passed easily. The voluntary sphincter was identified and the scope withdrawn.    ASSESSMENT AND PLAN:  Excellent outcome after urethroplasty. The patient will follow-up prn. He did consent to remote f/u.  ED refractory to Viagra 200. He will call for an Edex visit if he decides he want to try that.

## 2020-09-25 ENCOUNTER — TELEPHONE (OUTPATIENT)
Dept: UROLOGY | Facility: CLINIC | Age: 55
End: 2020-09-25

## 2020-09-25 ENCOUNTER — PRE VISIT (OUTPATIENT)
Dept: UROLOGY | Facility: CLINIC | Age: 55
End: 2020-09-25

## 2020-09-25 NOTE — TELEPHONE ENCOUNTER
Left message for pt to call and reschedule appt to a virtual visit in virtual time slot with either Dr. Cooley or Liz Carmona. Pt does not need to come into the clinic for a med check.

## 2020-09-25 NOTE — TELEPHONE ENCOUNTER
----- Message from Barbara Liang CMA sent at 9/25/2020 10:32 AM CDT -----  Regarding: URGENT - reschedule to virtual  Sachin doesn't need to see Dr. Foster in person. Please schedule him for a virtual visit with either Dr. Foster of Liz (Anabel if it's just for a viagra refill).    Barbara

## 2020-09-25 NOTE — TELEPHONE ENCOUNTER
Reason for visit: Medication follow up      Relevant information: history of urethroplasty, ED    Records/imaging/labs/orders: all records available, pt instructed to follow up PRN    Pt called: no answer, message routed to CC to move to virtual with DEANDRE    At Rooming: standard

## 2020-10-22 ENCOUNTER — APPOINTMENT (OUTPATIENT)
Dept: LAB | Facility: CLINIC | Age: 55
End: 2020-10-22
Payer: COMMERCIAL

## 2020-10-22 ENCOUNTER — OFFICE VISIT (OUTPATIENT)
Dept: UROLOGY | Facility: CLINIC | Age: 55
End: 2020-10-22
Payer: COMMERCIAL

## 2020-10-22 VITALS — BODY MASS INDEX: 25.9 KG/M2 | WEIGHT: 185 LBS | HEIGHT: 71 IN

## 2020-10-22 DIAGNOSIS — N52.1 ERECTILE DYSFUNCTION DUE TO DISEASES CLASSIFIED ELSEWHERE: Primary | ICD-10-CM

## 2020-10-22 LAB — PROLACTIN SERPL-MCNC: 12 UG/L (ref 2–18)

## 2020-10-22 PROCEDURE — 99213 OFFICE O/P EST LOW 20 MIN: CPT | Performed by: UROLOGY

## 2020-10-22 PROCEDURE — 84146 ASSAY OF PROLACTIN: CPT | Mod: 90 | Performed by: PATHOLOGY

## 2020-10-22 PROCEDURE — 99000 SPECIMEN HANDLING OFFICE-LAB: CPT | Performed by: PATHOLOGY

## 2020-10-22 PROCEDURE — 84403 ASSAY OF TOTAL TESTOSTERONE: CPT | Mod: 90 | Performed by: PATHOLOGY

## 2020-10-22 PROCEDURE — 36415 COLL VENOUS BLD VENIPUNCTURE: CPT | Performed by: PATHOLOGY

## 2020-10-22 PROCEDURE — 84270 ASSAY OF SEX HORMONE GLOBUL: CPT | Mod: 90 | Performed by: PATHOLOGY

## 2020-10-22 ASSESSMENT — PAIN SCALES - GENERAL: PAINLEVEL: NO PAIN (0)

## 2020-10-22 ASSESSMENT — MIFFLIN-ST. JEOR: SCORE: 1691.28

## 2020-10-22 NOTE — LETTER
10/22/2020       RE: Montana Jo  [address] [place] [address]     Dear Colleague,    Thank you for referring your patient, Montana Jo, to the [facility]. Please see a copy of my visit note below.    I am seeing Montana Jo in consultation for evaluation of erectile dysfunction.    HPI:  Montana Jo is a 55 year old male with history of urethral trauma in his 20's.  Had serial urethral dilations over the years, [place] [facility] [place]- an EPA, that was done 4/2017.    Had been using Viagra off and on for many years.  After his urethroplasty he's noted decreased erectile function, refractory to Viagra 200mg.  Some response to PDE5i, but still not great.    He's interested in trial of Edex.    ED/Vascular disease risk factors:  HTN:   No  Hyperlipidemia: yes, treated.  Smoking: no   DM: no  Cardiovascular disease: None   Meds associated with ED that he's taking: none  Anxiety/anger/depression:  No  Penile Plaques or curvature:  none.     REVIEW OF SYSTEMS:  General: negative  Skin: negative  Eyes: negative  Ears/Nose/Throat: negative  Respiratory: negative  Cardiovascular: negative  Gastrointestinal: negative  Genitourinary: see HPI  Musculoskeletal: negative  Neurologic: negative  Psychiatric: negative  Hematologic/Lymphatic/Immunologic: negative  Endocrine: negative    PAST MEDICAL HX:  Past Medical History:   Diagnosis Date     High cholesterol      Urethral straddle injury      Urethral stricture        PAST SURG HX:  Past Surgical History:   Procedure Laterality Date     ORTHOPEDIC SURGERY      Left knee scope. No implants.     urethral dilation       URETHROPLASTY N/A 4/21/2017    Procedure: URETHROPLASTY;  Posterior Urethroplasty and cystoscopy;  Surgeon: Deni Foster MD;  Location:  OR        FAMILY HX:  Strong male history of CAP on his father and brother, grandfather,  "uncle(s).    SOCIAL HX:  Social History     Tobacco Use     Smoking status: Never Smoker     Smokeless tobacco: Never Used   Substance Use Topics     Alcohol use: Yes     Alcohol/week: 0.0 standard drinks     Comment: 2-3 glasses of wine per night.     Drug use: No       MEDICATIONS:  Current Outpatient Medications   Medication Sig     atorvastatin (LIPITOR) 10 MG tablet TK 1 T PO QD     atovaquone-proguanil HCl 62.5-25 MG TABS Take 1 tablet by mouth     IBUPROFEN PO Take 200 mg by mouth     sildenafil (VIAGRA) 100 MG cap/tab Take 100 mg by mouth     No current facility-administered medications for this visit.      ALLERGIES:  Patient has no known allergies.    GENERAL PHYSICAL EXAM:     Ht 1.803 m (5' 11\")   Wt 83.9 kg (185 lb)   BMI 25.80 kg/m     Constitutional: No acute distress. Well nourished.   PSYCH: normal mood and affect.  NEURO: normal gait, no focal deficits.   EYES: anicteric, EOMI, PERR.  CARDIOPULMONARY: breathing non-labored, pulse regular rate/rhythm, no peripheral edema.  GI: Abdomen soft, non-tender, nondistended   MUSCULOSKELETAL: normal limb proportions, no muscle wasting, no contractures.  SKIN: Normal virilized hair distribution, no lesions, warts or rashes over genitalia, abdomen extremities or face.  HEME/LYMPH: no ecchymosis, no lymphadenopathy in groin, no lymphedema.     EXAM:  Phallus circumcised, meatus adequate, no plaques palpated.   Left testis descended , size is normal  , consistency is normal . No intra-testicular masses.   Right testis descended , size is normal  , consistency is normal . No intra-testicular masses.   Epididymes present, non-tender, not-enlarged.   Left cord: Vas present. no varicocele noted.  Right cord: Vas present. no varicocele noted.     Imaging/labs:  none    PSA coming up every 2 years.    ASSESSMENT:     ED- sounds like veno-occlusive dysfunction given early age of onset and long-standing issue.  Discussed that he could have arterial disease also, " though he has minimal risk factors for this.  I discussed that ED can be a strong predictor for cardiovascular disease and cardiac events.  We discussed the possible utility of a penile duplex ultrasound.  While this would not change treatment options for ED, this could lend support to a diagnosis of inflow disease versus venous leak.  If he does have narrowed cavernosal arteries, this would be strong motivation for cardiology referral.    PLAN:    PSA at next PCP physical in Jan '21    Testosterone and PRL today, hypogonadism screen.    Return to clinic for Edex trial with urology RN, would start with 20mcg dose please.    Chano Salazar MD   Urological Surgeon

## 2020-10-22 NOTE — PROGRESS NOTES
I am seeing Montana Jo in consultation for evaluation of erectile dysfunction.    HPI:  Montana Jo is a 55 year old male with history of urethral trauma in his 20's.  Had serial urethral dilations over the years, eventually saw [facility] for a urethroplasty- an EPA, that was done 4/2017.    Had been using Viagra off and on for many years.  After his urethroplasty he's noted decreased erectile function, refractory to Viagra 200mg.  Some response to PDE5i, but still not great.    He's interested in trial of Edex.    ED/Vascular disease risk factors:  HTN:   No  Hyperlipidemia: yes, treated.  Smoking: no   DM: no  Cardiovascular disease: None   Meds associated with ED that he's taking: none  Anxiety/anger/depression:  No  Penile Plaques or curvature:  none.     REVIEW OF SYSTEMS:  General: negative  Skin: negative  Eyes: negative  Ears/Nose/Throat: negative  Respiratory: negative  Cardiovascular: negative  Gastrointestinal: negative  Genitourinary: see HPI  Musculoskeletal: negative  Neurologic: negative  Psychiatric: negative  Hematologic/Lymphatic/Immunologic: negative  Endocrine: negative    PAST MEDICAL HX:  Past Medical History:   Diagnosis Date     High cholesterol      Urethral straddle injury      Urethral stricture        PAST SURG HX:  Past Surgical History:   Procedure Laterality Date     ORTHOPEDIC SURGERY      Left knee scope. No implants.     urethral dilation       URETHROPLASTY N/A 4/21/2017    Procedure: URETHROPLASTY;  Posterior Urethroplasty and cystoscopy;  Surgeon: Deni Foster MD;  Location:  OR        FAMILY HX:  Strong male history of CAP on his father and brother, grandfather, uncle(s).    SOCIAL HX:  Social History     Tobacco Use     Smoking status: Never Smoker     Smokeless tobacco: Never Used   Substance Use Topics     Alcohol use: Yes     Alcohol/week: 0.0 standard drinks     Comment: 2-3 glasses of wine per night.     Drug use: No       MEDICATIONS:  Current Outpatient  "Medications   Medication Sig     atorvastatin (LIPITOR) 10 MG tablet TK 1 T PO QD     atovaquone-proguanil HCl 62.5-25 MG TABS Take 1 tablet by mouth     IBUPROFEN PO Take 200 mg by mouth     sildenafil (VIAGRA) 100 MG cap/tab Take 100 mg by mouth     No current facility-administered medications for this visit.        ALLERGIES:  Patient has no known allergies.      GENERAL PHYSICAL EXAM:     Ht 1.803 m (5' 11\")   Wt 83.9 kg (185 lb)   BMI 25.80 kg/m     Constitutional: No acute distress. Well nourished.   PSYCH: normal mood and affect.  NEURO: normal gait, no focal deficits.   EYES: anicteric, EOMI, PERR.  CARDIOPULMONARY: breathing non-labored, pulse regular rate/rhythm, no peripheral edema.  GI: Abdomen soft, non-tender, nondistended   MUSCULOSKELETAL: normal limb proportions, no muscle wasting, no contractures.  SKIN: Normal virilized hair distribution, no lesions, warts or rashes over genitalia, abdomen extremities or face.  HEME/LYMPH: no ecchymosis, no lymphadenopathy in groin, no lymphedema.     EXAM:  Phallus circumcised, meatus adequate, no plaques palpated.   Left testis descended , size is normal  , consistency is normal . No intra-testicular masses.   Right testis descended , size is normal  , consistency is normal . No intra-testicular masses.   Epididymes present, non-tender, not-enlarged.   Left cord: Vas present. no varicocele noted.  Right cord: Vas present. no varicocele noted.       Imaging/labs:  none    PSA coming up every 2 years.    ASSESSMENT:     ED- sounds like veno-occlusive dysfunction given early age of onset and long-standing issue.  Discussed that he could have arterial disease also, though he has minimal risk factors for this.  I discussed that ED can be a strong predictor for cardiovascular disease and cardiac events.  We discussed the possible utility of a penile duplex ultrasound.  While this would not change treatment options for ED, this could lend support to a diagnosis of " inflow disease versus venous leak.  If he does have narrowed cavernosal arteries, this would be strong motivation for cardiology referral.    PLAN:    PSA at next PCP physical in Jan '21    Testosterone and PRL today, hypogonadism screen.    Return to clinic for Edex trial with urology RN, would start with 20mcg dose please.      Chano Salazra MD     Urological Surgeon

## 2020-10-22 NOTE — NURSING NOTE
"Chief Complaint   Patient presents with     Consult     ED, discuss Edex       Height 1.803 m (5' 11\"), weight 83.9 kg (185 lb). Body mass index is 25.8 kg/m .    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     Drug use: No       Gaby Miller LPN  10/22/2020  3:18 PM  "

## 2020-10-22 NOTE — PATIENT INSTRUCTIONS
Labs today.  Follow up with nurse visit with RN for Edex teaching.      It was a pleasure meeting with you today.  Thank you for allowing me and my team the privilege of caring for you today.  YOU are the reason we are here, and I truly hope we provided you with the excellent service you deserve.  Please let us know if there is anything else we can do for you so that we can be sure you are leaving completely satisfied with your care experience.

## 2020-10-26 LAB
SHBG SERPL-SCNC: 32 NMOL/L (ref 11–80)
TESTOST FREE SERPL-MCNC: 10.63 NG/DL (ref 4.7–24.4)
TESTOST SERPL-MCNC: 498 NG/DL (ref 240–950)

## 2020-11-01 NOTE — RESULT ENCOUNTER NOTE
Dear Montana     Here are your recent test results which are NORMAL.  There are no concerns.      Thank You,    Please let me know if you have any questions.  Kevyn GAO

## 2021-01-08 ENCOUNTER — OFFICE VISIT (OUTPATIENT)
Dept: UROLOGY | Facility: CLINIC | Age: 56
End: 2021-01-08
Payer: COMMERCIAL

## 2021-01-08 DIAGNOSIS — N52.1 ERECTILE DYSFUNCTION DUE TO DISEASES CLASSIFIED ELSEWHERE: Primary | ICD-10-CM

## 2021-01-08 PROCEDURE — 99207 PR NO BILLABLE SERVICE THIS VISIT: CPT

## 2021-01-08 PROCEDURE — 99211 OFF/OP EST MAY X REQ PHY/QHP: CPT

## 2021-01-08 NOTE — NURSING NOTE
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     Drug use: No       Patient comes into clinic today at the request of Dr. Chano Salazar for intracavernosal injection teaching. I did the injection teaching.     This service provided today was under the direct supervision of Dr. Salazar, who is   available if needed.     Patient presents to clinic for Edex injection teaching.  Edex informational patient packet was read and reviewed in clinic by patient.  Reviewed usage and possible side effects.  Questions answered appropriately. Discussed there needs to be some element of foreplay after injecting Edex.   This reaches it's full effectiveness in 20 minutes.   Patient injected 20 mcg of Edex into the cavernosa with out assistance.     After 20 minutes, patient reassessed.  Patient rates his erection a 8/10. Patient stated that it would be firm enough for penetration.  Patient educated on alternating sites for injection, left and right side of shaft. Also discussed not to use more than 3 times per week, at least 24 hours between each injection.   Also discussed prolonged erections and need to go to ER if that happens. He would need to go into the ER in 4 hours if his erection is prolonged.   Patient verbalized understanding.   Patient has no  erection at this time before.   No further questions.  Patient to call if he has any questions or concerns.  I am asking patient call me after the first injection to discuss the response.  Prescription for Edex 20 mcg sent in to patient's pharmacy.       The following medication was given:      MEDICATION: Edex  ROUTE: IC  SITE: penis  DOSE: 20 mcg  LOT #: [id number]  :  [employer]  EXPIRATION DATE:    NDC#: 84301-596-96               The following medication was given:     Was there drug waste? No  Multi-dose vial: No    Elisha Robertson RN  January 8, 2021

## 2021-01-08 NOTE — PROGRESS NOTES
Please see the nurse's note for details of the visit.  Elisha Robertson, RN   Care Coordinator Urology

## 2021-01-14 ENCOUNTER — HEALTH MAINTENANCE LETTER (OUTPATIENT)
Age: 56
End: 2021-01-14

## 2021-10-23 ENCOUNTER — HEALTH MAINTENANCE LETTER (OUTPATIENT)
Age: 56
End: 2021-10-23

## 2022-02-12 ENCOUNTER — HEALTH MAINTENANCE LETTER (OUTPATIENT)
Age: 57
End: 2022-02-12

## 2022-10-10 ENCOUNTER — HEALTH MAINTENANCE LETTER (OUTPATIENT)
Age: 57
End: 2022-10-10

## 2023-02-18 ENCOUNTER — HEALTH MAINTENANCE LETTER (OUTPATIENT)
Age: 58
End: 2023-02-18

## 2024-03-16 ENCOUNTER — HEALTH MAINTENANCE LETTER (OUTPATIENT)
Age: 59
End: 2024-03-16

## (undated) DEVICE — SU SILK 2-0 SH CR 5X18" C0125

## (undated) DEVICE — SUCTION MANIFOLD NEPTUNE 2 SYS 4 PORT 0702-020-000

## (undated) DEVICE — TUBING IRRIG CYSTO/BLADDER SET 81" LF 2C4040

## (undated) DEVICE — LINEN TOWEL PACK X5 5464

## (undated) DEVICE — CATH SECURE 5445-3

## (undated) DEVICE — PANTIES MESH LG/XLG 2PK 706M2

## (undated) DEVICE — LINEN TOWEL PACK X6 WHITE 5487

## (undated) DEVICE — DRAPE LEGGINGS CLEAR 8430

## (undated) DEVICE — GLOVE PROTEXIS W/NEU-THERA 7.5  2D73TE75

## (undated) DEVICE — CATH PLUG W/CAP 000076

## (undated) DEVICE — TUBING SUCTION 12"X1/4" N612

## (undated) DEVICE — BLADE CLIPPER SGL USE 9680

## (undated) DEVICE — PUNCH FOLEY CATHETER TIP 14FR OR LARGER 053000

## (undated) DEVICE — PREP CHLORAPREP 26ML TINTED ORANGE  260815

## (undated) DEVICE — SU CHROMIC 4-0 SH 27" G121H

## (undated) DEVICE — Device

## (undated) DEVICE — DRAPE MAYO STAND 23X54 8337

## (undated) DEVICE — SOL NACL 0.9% IRRIG 1000ML BOTTLE 2F7124

## (undated) DEVICE — SYR 50ML LL W/O NDL 309653

## (undated) DEVICE — PACK ENT MINOR CUSTOM ASC

## (undated) DEVICE — SUTURE BOOTS 051003PBX

## (undated) DEVICE — VESSEL LOOPS BLUE SUPERMAXI 011022PBX

## (undated) DEVICE — DRAIN PENROSE 0.25"X18" LATEX FREE GR201

## (undated) DEVICE — DRAPE POUCH INSTRUMENT 1018

## (undated) DEVICE — SU ETHILON 3-0 FS-1 18" 663G

## (undated) DEVICE — SU MONOCRYL 4-0 RB-1 27" Y214H

## (undated) DEVICE — GLOVE PROTEXIS W/NEU-THERA 8.0  2D73TE80

## (undated) DEVICE — SU MONOCRYL 4-0 PS-2 27" UND Y426H

## (undated) DEVICE — ESU GROUND PAD ADULT W/CORD E7507

## (undated) DEVICE — SOL NACL 0.9% INJ 1000ML BAG 2B1324X

## (undated) DEVICE — SU VICRYL 3-0 SH 27" UND J416H

## (undated) DEVICE — SU VICRYL 4-0 RB-1 27" UND J214H

## (undated) DEVICE — CONNECTOR WATER VALVE PERFUSION PACK STR 020272801

## (undated) DEVICE — SYR 50ML CATH TIP W/O NDL 309620

## (undated) DEVICE — JELLY LUBRICATING SURGILUBE 2OZ TUBE

## (undated) DEVICE — LINEN GOWN XLG 5407

## (undated) DEVICE — SPONGE RAY-TEC 4X8" 7318

## (undated) DEVICE — CATH FOLYSIL 16FR 15ML AA6116

## (undated) DEVICE — SYR BULB IRRIG 50ML LATEX FREE 0035280

## (undated) DEVICE — SU PDS II 5-0 RB-1 DA 30" Z320H

## (undated) DEVICE — KIT ENDO FIRST STEP DISINFECTANT 200ML W/POUCH EP-4

## (undated) DEVICE — DRAPE GYN/UROLOGY FLUID POUCH TUR 29455

## (undated) RX ORDER — GABAPENTIN 300 MG/1
CAPSULE ORAL
Status: DISPENSED
Start: 2017-04-21

## (undated) RX ORDER — PROPOFOL 10 MG/ML
INJECTION, EMULSION INTRAVENOUS
Status: DISPENSED
Start: 2017-04-21

## (undated) RX ORDER — ACETAMINOPHEN 325 MG/1
TABLET ORAL
Status: DISPENSED
Start: 2017-04-21

## (undated) RX ORDER — OXYCODONE HYDROCHLORIDE 10 MG/1
TABLET ORAL
Status: DISPENSED
Start: 2017-04-21

## (undated) RX ORDER — ONDANSETRON 2 MG/ML
INJECTION INTRAMUSCULAR; INTRAVENOUS
Status: DISPENSED
Start: 2017-04-21

## (undated) RX ORDER — CHLORHEXIDINE GLUCONATE ORAL RINSE 1.2 MG/ML
SOLUTION DENTAL
Status: DISPENSED
Start: 2017-04-21

## (undated) RX ORDER — FENTANYL CITRATE 50 UG/ML
INJECTION, SOLUTION INTRAMUSCULAR; INTRAVENOUS
Status: DISPENSED
Start: 2017-04-21

## (undated) RX ORDER — DEXAMETHASONE SODIUM PHOSPHATE 4 MG/ML
INJECTION, SOLUTION INTRA-ARTICULAR; INTRALESIONAL; INTRAMUSCULAR; INTRAVENOUS; SOFT TISSUE
Status: DISPENSED
Start: 2017-04-21

## (undated) RX ORDER — BUPIVACAINE HYDROCHLORIDE AND EPINEPHRINE 5; 5 MG/ML; UG/ML
INJECTION, SOLUTION EPIDURAL; INTRACAUDAL; PERINEURAL
Status: DISPENSED
Start: 2017-04-21

## (undated) RX ORDER — CEFTRIAXONE SODIUM 1 G
VIAL (EA) INJECTION
Status: DISPENSED
Start: 2017-04-21